# Patient Record
Sex: FEMALE | Race: WHITE | NOT HISPANIC OR LATINO | URBAN - METROPOLITAN AREA
[De-identification: names, ages, dates, MRNs, and addresses within clinical notes are randomized per-mention and may not be internally consistent; named-entity substitution may affect disease eponyms.]

---

## 2017-02-17 ENCOUNTER — ON CAMPUS - OUTPATIENT (OUTPATIENT)
Dept: URBAN - METROPOLITAN AREA HOSPITAL 77 | Facility: HOSPITAL | Age: 52
End: 2017-02-17
Payer: COMMERCIAL

## 2017-02-17 DIAGNOSIS — Z12.11 ENCOUNTER FOR SCREENING FOR MALIGNANT NEOPLASM OF COLON: ICD-10-CM

## 2017-02-17 PROCEDURE — 45378 DIAGNOSTIC COLONOSCOPY: CPT | Performed by: INTERNAL MEDICINE

## 2019-09-10 ENCOUNTER — TELEPHONE (OUTPATIENT)
Dept: FAMILY MEDICINE CLINIC | Facility: CLINIC | Age: 54
End: 2019-09-10

## 2019-09-10 PROBLEM — Z12.39 ENCOUNTER FOR SCREENING FOR MALIGNANT NEOPLASM OF BREAST: Status: ACTIVE | Noted: 2017-01-04

## 2019-09-10 PROBLEM — Z87.891 FORMER SMOKER: Status: ACTIVE | Noted: 2017-01-04

## 2019-09-10 PROBLEM — Z12.11 COLON CANCER SCREENING: Status: ACTIVE | Noted: 2017-01-04

## 2019-09-10 PROBLEM — J02.9 SORE THROAT: Status: ACTIVE | Noted: 2017-01-18

## 2019-09-10 PROBLEM — J32.9 SINUSITIS: Status: ACTIVE | Noted: 2017-01-18

## 2019-09-10 PROBLEM — Z11.59 ENCOUNTER FOR SCREENING FOR OTHER VIRAL DISEASES: Status: ACTIVE | Noted: 2018-08-02

## 2019-09-10 PROBLEM — R03.0 ELEVATED BLOOD PRESSURE READING WITHOUT DIAGNOSIS OF HYPERTENSION: Status: ACTIVE | Noted: 2017-01-04

## 2019-09-10 PROBLEM — R53.83 FATIGUE: Status: ACTIVE | Noted: 2018-08-02

## 2019-09-10 PROBLEM — Z12.31 VISIT FOR SCREENING MAMMOGRAM: Status: ACTIVE | Noted: 2018-05-23

## 2019-09-10 RX ORDER — VENLAFAXINE HYDROCHLORIDE 75 MG/1
75 CAPSULE, EXTENDED RELEASE ORAL DAILY
Qty: 90 CAPSULE | Refills: 0 | Status: SHIPPED | OUTPATIENT
Start: 2019-09-10 | End: 2019-11-13 | Stop reason: SDUPTHER

## 2019-09-10 RX ORDER — ALENDRONATE SODIUM 70 MG/1
1 TABLET ORAL
COMMUNITY
Start: 2018-10-02 | End: 2019-09-26

## 2019-09-10 RX ORDER — VENLAFAXINE HYDROCHLORIDE 75 MG/1
1 CAPSULE, EXTENDED RELEASE ORAL DAILY
COMMUNITY
Start: 2014-03-19 | End: 2019-09-10 | Stop reason: SDUPTHER

## 2019-09-10 NOTE — TELEPHONE ENCOUNTER
Last visit:  8/2/18  Next visit: 9/26/19  Last labs: none    Rx requested: Venlafaxine ER 75mg caps  Pharmacy: [facility]

## 2019-09-26 ENCOUNTER — OFFICE VISIT (OUTPATIENT)
Dept: FAMILY MEDICINE CLINIC | Facility: CLINIC | Age: 54
End: 2019-09-26

## 2019-09-26 VITALS
WEIGHT: 164 LBS | BODY MASS INDEX: 25.74 KG/M2 | SYSTOLIC BLOOD PRESSURE: 144 MMHG | HEIGHT: 67 IN | RESPIRATION RATE: 14 BRPM | HEART RATE: 57 BPM | OXYGEN SATURATION: 97 % | DIASTOLIC BLOOD PRESSURE: 91 MMHG | TEMPERATURE: 98.1 F

## 2019-09-26 DIAGNOSIS — M81.8 OTHER OSTEOPOROSIS WITHOUT CURRENT PATHOLOGICAL FRACTURE: ICD-10-CM

## 2019-09-26 DIAGNOSIS — Z00.00 ENCOUNTER FOR GENERAL ADULT MEDICAL EXAMINATION WITHOUT ABNORMAL FINDINGS: Primary | ICD-10-CM

## 2019-09-26 DIAGNOSIS — E55.9 VITAMIN D DEFICIENCY: ICD-10-CM

## 2019-09-26 DIAGNOSIS — E61.1 IRON DEFICIENCY: ICD-10-CM

## 2019-09-26 DIAGNOSIS — Z23 FLU VACCINE NEED: ICD-10-CM

## 2019-09-26 DIAGNOSIS — Z12.39 SCREENING FOR BREAST CANCER: ICD-10-CM

## 2019-09-26 DIAGNOSIS — Z79.899 MEDICATION MANAGEMENT: ICD-10-CM

## 2019-09-26 LAB
ALBUMIN SERPL-MCNC: 4.4 G/DL (ref 3.5–4.8)
ALBUMIN/GLOB SERPL: 1.5 G/DL (ref 1–1.7)
ALP SERPL-CCNC: 72 U/L (ref 32–91)
ALT SERPL W P-5'-P-CCNC: 47 U/L (ref 14–54)
ANION GAP SERPL CALCULATED.3IONS-SCNC: 13.1 MMOL/L (ref 5–15)
AST SERPL-CCNC: 44 U/L (ref 15–41)
BILIRUB SERPL-MCNC: 0.6 MG/DL (ref 0.3–1.2)
BUN BLD-MCNC: 12 MG/DL (ref 8–20)
BUN/CREAT SERPL: 15 (ref 5.4–26.2)
CALCIUM SPEC-SCNC: 9.7 MG/DL (ref 8.9–10.3)
CHLORIDE SERPL-SCNC: 105 MMOL/L (ref 101–111)
CO2 SERPL-SCNC: 31 MMOL/L (ref 22–32)
CREAT BLD-MCNC: 0.8 MG/DL (ref 0.4–1)
DEPRECATED RDW RBC AUTO: 51.2 FL (ref 37–54)
ERYTHROCYTE [DISTWIDTH] IN BLOOD BY AUTOMATED COUNT: 15.4 % (ref 12.3–15.4)
FERRITIN SERPL-MCNC: 11 NG/ML (ref 11–307)
GFR SERPL CREATININE-BSD FRML MDRD: 75 ML/MIN/1.73
GLOBULIN UR ELPH-MCNC: 2.9 GM/DL (ref 2.5–3.8)
GLUCOSE BLD-MCNC: 84 MG/DL (ref 65–99)
HCT VFR BLD AUTO: 40.5 % (ref 34–46.6)
HGB BLD-MCNC: 13.4 G/DL (ref 12–15.9)
IRON 24H UR-MRATE: 73 MCG/DL (ref 28–170)
IRON SATN MFR SERPL: 16 % (ref 15–50)
MCH RBC QN AUTO: 31.3 PG (ref 26.6–33)
MCHC RBC AUTO-ENTMCNC: 33.1 G/DL (ref 31.5–35.7)
MCV RBC AUTO: 94.6 FL (ref 79–97)
PLATELET # BLD AUTO: 264 10*3/MM3 (ref 140–450)
PMV BLD AUTO: 8.9 FL (ref 6–12)
POTASSIUM BLD-SCNC: 4.1 MMOL/L (ref 3.6–5.1)
PROT SERPL-MCNC: 7.3 G/DL (ref 6.1–7.9)
RBC # BLD AUTO: 4.28 10*6/MM3 (ref 3.77–5.28)
SODIUM BLD-SCNC: 145 MMOL/L (ref 136–144)
TIBC SERPL-MCNC: 454 MCG/DL (ref 228–428)
TRANSFERRIN SERPL-MCNC: 324 MG/DL (ref 200–360)
WBC NRBC COR # BLD: 5.6 10*3/MM3 (ref 3.4–10.8)

## 2019-09-26 PROCEDURE — 85027 COMPLETE CBC AUTOMATED: CPT | Performed by: FAMILY MEDICINE

## 2019-09-26 PROCEDURE — 83540 ASSAY OF IRON: CPT | Performed by: FAMILY MEDICINE

## 2019-09-26 PROCEDURE — 87624 HPV HI-RISK TYP POOLED RSLT: CPT | Performed by: FAMILY MEDICINE

## 2019-09-26 PROCEDURE — 99396 PREV VISIT EST AGE 40-64: CPT | Performed by: FAMILY MEDICINE

## 2019-09-26 PROCEDURE — G0148 SCR C/V CYTO, AUTOSYS, RESCR: HCPCS

## 2019-09-26 PROCEDURE — 84466 ASSAY OF TRANSFERRIN: CPT | Performed by: FAMILY MEDICINE

## 2019-09-26 PROCEDURE — 80053 COMPREHEN METABOLIC PANEL: CPT | Performed by: FAMILY MEDICINE

## 2019-09-26 PROCEDURE — 82306 VITAMIN D 25 HYDROXY: CPT | Performed by: FAMILY MEDICINE

## 2019-09-26 PROCEDURE — 90471 IMMUNIZATION ADMIN: CPT | Performed by: FAMILY MEDICINE

## 2019-09-26 PROCEDURE — 90674 CCIIV4 VAC NO PRSV 0.5 ML IM: CPT | Performed by: FAMILY MEDICINE

## 2019-09-26 PROCEDURE — 82728 ASSAY OF FERRITIN: CPT | Performed by: FAMILY MEDICINE

## 2019-09-26 RX ORDER — LORATADINE 10 MG/1
10 TABLET ORAL DAILY
COMMUNITY

## 2019-09-27 LAB — 25(OH)D3 SERPL-MCNC: 84.8 NG/ML (ref 30–100)

## 2019-10-02 LAB
AGE GDLN ACOG TESTING: NORMAL
CHROM ANALY OVERALL INTERP-IMP: NORMAL
CONV .: NORMAL
CONV PERFORMED BY:: NORMAL
DX ICD CODE: NORMAL
HIV 1 & 2 AB SER-IMP: NORMAL
HPV I/H RISK 1 DNA CVX QL PROBE+SIG AMP: NEGATIVE
Lab: NORMAL
RECOM F/U CVX/VAG CYTO: NORMAL
REF LAB TEST METHOD: NORMAL
STAT OF ADQ CVX/VAG CYTO-IMP: NORMAL

## 2019-10-23 DIAGNOSIS — Z12.39 SCREENING FOR BREAST CANCER: ICD-10-CM

## 2019-11-11 NOTE — PROGRESS NOTES
Subjective   Anali Julio is a 54 y.o. female.     Chief Complaint   Patient presents with   • Annual Exam     physical with papsmear       History of Present Illness     The following portions of the patient's history were reviewed and updated as appropriate: allergies, current medications, past family history, past medical history, past social history, past surgical history and problem list.    Past Medical History:   Diagnosis Date   • Anemia 1/30/2012   • Dry eye syndrome 1/30/2012   • Dysthymia 1/30/2012   • Elevated blood pressure reading without diagnosis of hypertension 1/4/2017   • Encounter for general adult medical examination without abnormal findings 10/28/2015   • Encounter for screening for malignant neoplasm of breast 1/4/2017   • Encounter for screening for other viral diseases 8/2/2018   • Former smoker 1/4/2017   • Generalized anxiety disorder 10/28/2015   • Increased frequency of urination 9/22/2015   • Kidney stone 2010   • Osteopenia 2010    Osteopenia   • Vitamin D deficiency 1/30/2012       Past Surgical History:   Procedure Laterality Date   • COLONOSCOPY  2017    Clear results   • TUBAL ABDOMINAL LIGATION  2001       Family History   Problem Relation Age of Onset   • Arthritis Mother    • Hyperlipidemia Mother    • Heart disease Father    • Hyperlipidemia Maternal Grandmother    • Vision loss Maternal Grandmother        Review of Systems   Constitutional: Negative for fatigue, unexpected weight gain and unexpected weight loss.   HENT: Negative for congestion, sinus pressure and sore throat.    Eyes: Negative for blurred vision and visual disturbance.   Respiratory: Negative for cough, shortness of breath and wheezing.    Cardiovascular: Negative for chest pain, palpitations and leg swelling.   Gastrointestinal: Negative for abdominal pain, constipation, diarrhea, nausea, vomiting, GERD and indigestion.   Musculoskeletal: Negative for arthralgias, back pain, gait problem, joint swelling and  neck pain.   Skin: Negative for rash, skin lesions and bruise.   Allergic/Immunologic: Negative for environmental allergies.   Neurological: Negative for dizziness, tremors, syncope, weakness, light-headedness, headache and memory problem.   Psychiatric/Behavioral: Negative for sleep disturbance, depressed mood and stress. The patient is not nervous/anxious.        Objective   Physical Exam   Constitutional: She is oriented to person, place, and time. She appears well-developed and well-nourished. No distress.   HENT:   Head: Normocephalic and atraumatic.   Right Ear: External ear normal.   Left Ear: External ear normal.   Nose: Nose normal.   Mouth/Throat: Oropharynx is clear and moist.   Eyes: EOM are normal. Pupils are equal, round, and reactive to light.   Neck: Normal range of motion. Neck supple. No thyromegaly present.   Cardiovascular: Normal rate, regular rhythm and normal heart sounds. Exam reveals no gallop and no friction rub.   No murmur heard.  Pulmonary/Chest: Effort normal. She has no wheezes.   Abdominal: Soft. There is no tenderness.   Genitourinary: Vagina normal and uterus normal. No vaginal discharge found.   Musculoskeletal: Normal range of motion. She exhibits no edema, tenderness or deformity.   Lymphadenopathy:     She has no cervical adenopathy.   Neurological: She is alert and oriented to person, place, and time. No cranial nerve deficit.   Skin: Skin is warm and dry. No rash noted. She is not diaphoretic.   Psychiatric: She has a normal mood and affect. Her behavior is normal. Judgment and thought content normal.   Nursing note and vitals reviewed.        Assessment/Plan   Anali was seen today for annual exam.    Diagnoses and all orders for this visit:    Encounter for general adult medical examination without abnormal findings  -     PapIG HPV Age Gdln ACOG; Future  -     PapIG HPV Age Gdln ACOG  -     PapIG, HPV, Rfx 16 / 18; Future  -     PapIG, HPV, Rfx 16 / 18    Iron deficiency  -      CBC (No Diff); Future  -     Iron Profile  -     Ferritin  -     CBC (No Diff)    Medication management  -     Comprehensive Metabolic Panel    Vitamin D deficiency  -     Vitamin D 25 Hydroxy    Other osteoporosis without current pathological fracture  -     DEXA Bone Density Axial    Screening for breast cancer  -     Mammo Screening Digital Tomosynthesis Bilateral With CAD; Future    Flu vaccine need  -     Flucelvax Quad=>4Years (PFS)    fu labs  Refill meds  Schedule mammogram and Dexa             Vitals:    09/26/19 1410   BP: 144/91   Pulse: 57   Resp: 14   Temp: 98.1 °F (36.7 °C)   SpO2: 97%     Body mass index is 25.69 kg/m².    No results found for: HGBA1C, POCGLU, LDL, CBC, CMP, TSH  Results for orders placed or performed in visit on 09/26/19   PapIG HPV Age Gdln ACOG   Result Value Ref Range    Age Gdln ACOG Testing 30-65    PapIG, HPV, Rfx 16 / 18   Result Value Ref Range    Diagnosis Comment     Recommendation: Comment     Specimen adequacy: Comment     Clinician Provided ICD-10: Comment     Performed by: Comment     QC reviewed by: Comment     . .     Note: Comment     Method: Comment     HPV, high-risk Negative Negative   Iron Profile   Result Value Ref Range    Iron 73 28 - 170 mcg/dL    Iron Saturation 16 15 - 50 %    Transferrin 324 200 - 360 mg/dL    TIBC 454 (H) 228 - 428 mcg/dL   Vitamin D 25 Hydroxy   Result Value Ref Range    25 Hydroxy, Vitamin D 84.8 30.0 - 100.0 ng/ml   CBC (No Diff)   Result Value Ref Range    WBC 5.60 3.40 - 10.80 10*3/mm3    RBC 4.28 3.77 - 5.28 10*6/mm3    Hemoglobin 13.4 12.0 - 15.9 g/dL    Hematocrit 40.5 34.0 - 46.6 %    MCV 94.6 79.0 - 97.0 fL    MCH 31.3 26.6 - 33.0 pg    MCHC 33.1 31.5 - 35.7 g/dL    RDW 15.4 12.3 - 15.4 %    RDW-SD 51.2 37.0 - 54.0 fl    MPV 8.9 6.0 - 12.0 fL    Platelets 264 140 - 450 10*3/mm3   Ferritin   Result Value Ref Range    Ferritin 11.00 11.00 - 307.00 ng/mL   Comprehensive Metabolic Panel   Result Value Ref Range    Glucose 84 65 -  99 mg/dL    BUN 12 8 - 20 mg/dL    Creatinine 0.80 0.40 - 1.00 mg/dL    Sodium 145 (H) 136 - 144 mmol/L    Potassium 4.1 3.6 - 5.1 mmol/L    Chloride 105 101 - 111 mmol/L    CO2 31.0 22.0 - 32.0 mmol/L    Calcium 9.7 8.9 - 10.3 mg/dL    Total Protein 7.3 6.1 - 7.9 g/dL    Albumin 4.40 3.50 - 4.80 g/dL    ALT (SGPT) 47 14 - 54 U/L    AST (SGOT) 44 (H) 15 - 41 U/L    Alkaline Phosphatase 72 32 - 91 U/L    Total Bilirubin 0.6 0.3 - 1.2 mg/dL    eGFR Non African Amer 75 >60 mL/min/1.73    Globulin 2.9 2.5 - 3.8 gm/dL    A/G Ratio 1.5 1.0 - 1.7 g/dL    BUN/Creatinine Ratio 15.0 5.4 - 26.2    Anion Gap 13.1 5.0 - 15.0 mmol/L

## 2019-11-15 RX ORDER — VENLAFAXINE HYDROCHLORIDE 75 MG/1
CAPSULE, EXTENDED RELEASE ORAL
Qty: 90 CAPSULE | Refills: 0 | Status: SHIPPED | OUTPATIENT
Start: 2019-11-15 | End: 2020-01-30 | Stop reason: SDUPTHER

## 2020-01-30 RX ORDER — VENLAFAXINE HYDROCHLORIDE 75 MG/1
75 CAPSULE, EXTENDED RELEASE ORAL DAILY
Qty: 90 CAPSULE | Refills: 0 | Status: SHIPPED | OUTPATIENT
Start: 2020-01-30 | End: 2020-02-04 | Stop reason: SDUPTHER

## 2020-01-30 NOTE — TELEPHONE ENCOUNTER
Last visit:  9/26/19  Next visit:none  Last labs: 9/26/19     Rx requested: venlafaxine XR   Pharmacy: [facility]

## 2020-02-04 RX ORDER — VENLAFAXINE HYDROCHLORIDE 75 MG/1
75 CAPSULE, EXTENDED RELEASE ORAL DAILY
Qty: 90 CAPSULE | Refills: 0 | Status: SHIPPED | OUTPATIENT
Start: 2020-02-04 | End: 2020-05-22

## 2020-03-11 ENCOUNTER — HOSPITAL ENCOUNTER (OUTPATIENT)
Dept: BONE DENSITY | Facility: HOSPITAL | Age: 55
Discharge: HOME | End: 2020-03-11
Admitting: FAMILY MEDICINE

## 2020-03-11 PROCEDURE — 77080 DXA BONE DENSITY AXIAL: CPT

## 2020-05-22 RX ORDER — VENLAFAXINE HYDROCHLORIDE 75 MG/1
CAPSULE, EXTENDED RELEASE ORAL
Qty: 90 CAPSULE | Refills: 0 | Status: SHIPPED | OUTPATIENT
Start: 2020-05-22 | End: 2020-07-21

## 2020-05-22 NOTE — TELEPHONE ENCOUNTER
( patient)    Last visit: 9/26/19   Next visit: none  Last labs: 9/26/19    Rx requested: Venlafaxine   Pharmacy:[facility]

## 2020-07-21 RX ORDER — VENLAFAXINE HYDROCHLORIDE 75 MG/1
CAPSULE, EXTENDED RELEASE ORAL
Qty: 90 CAPSULE | Refills: 0 | Status: SHIPPED | OUTPATIENT
Start: 2020-07-21 | End: 2020-09-21

## 2020-08-27 ENCOUNTER — OFFICE VISIT (OUTPATIENT)
Dept: FAMILY MEDICINE CLINIC | Facility: CLINIC | Age: 55
End: 2020-08-27

## 2020-08-27 VITALS
TEMPERATURE: 98 F | HEART RATE: 64 BPM | RESPIRATION RATE: 14 BRPM | HEIGHT: 67 IN | OXYGEN SATURATION: 96 % | SYSTOLIC BLOOD PRESSURE: 162 MMHG | DIASTOLIC BLOOD PRESSURE: 90 MMHG | WEIGHT: 167 LBS | BODY MASS INDEX: 26.21 KG/M2

## 2020-08-27 DIAGNOSIS — Z11.59 NEED FOR HEPATITIS C SCREENING TEST: ICD-10-CM

## 2020-08-27 DIAGNOSIS — R30.0 DYSURIA: ICD-10-CM

## 2020-08-27 DIAGNOSIS — R03.0 ELEVATED BLOOD PRESSURE READING: ICD-10-CM

## 2020-08-27 DIAGNOSIS — E04.9 GOITER: ICD-10-CM

## 2020-08-27 DIAGNOSIS — E78.2 MIXED DYSLIPIDEMIA: ICD-10-CM

## 2020-08-27 DIAGNOSIS — R10.31 RIGHT LOWER QUADRANT ABDOMINAL PAIN: Primary | ICD-10-CM

## 2020-08-27 PROCEDURE — 80053 COMPREHEN METABOLIC PANEL: CPT | Performed by: FAMILY MEDICINE

## 2020-08-27 PROCEDURE — 36415 COLL VENOUS BLD VENIPUNCTURE: CPT | Performed by: FAMILY MEDICINE

## 2020-08-27 PROCEDURE — 83690 ASSAY OF LIPASE: CPT | Performed by: FAMILY MEDICINE

## 2020-08-27 PROCEDURE — 80061 LIPID PANEL: CPT | Performed by: FAMILY MEDICINE

## 2020-08-27 PROCEDURE — 84443 ASSAY THYROID STIM HORMONE: CPT | Performed by: FAMILY MEDICINE

## 2020-08-27 PROCEDURE — 82150 ASSAY OF AMYLASE: CPT | Performed by: FAMILY MEDICINE

## 2020-08-27 PROCEDURE — 86803 HEPATITIS C AB TEST: CPT | Performed by: FAMILY MEDICINE

## 2020-08-27 PROCEDURE — 85025 COMPLETE CBC W/AUTO DIFF WBC: CPT | Performed by: FAMILY MEDICINE

## 2020-08-27 PROCEDURE — 99214 OFFICE O/P EST MOD 30 MIN: CPT | Performed by: FAMILY MEDICINE

## 2020-08-27 PROCEDURE — 84439 ASSAY OF FREE THYROXINE: CPT | Performed by: FAMILY MEDICINE

## 2020-08-27 PROCEDURE — 81003 URINALYSIS AUTO W/O SCOPE: CPT | Performed by: FAMILY MEDICINE

## 2020-08-27 NOTE — PROGRESS NOTES
Subjective   Anali Julio is a 55 y.o. female.     Chief Complaint   Patient presents with   • Abdominal Pain     abdominal pain started a year ago and it is getting worse.        History of Present Illness   Pt has had abdominal pain for past year more freqeunt in past month  She did miss work earlier this week with pain, nausea, vomiting    BP elevated today  Family hx CAd    The following portions of the patient's history were reviewed and updated as appropriate: allergies, current medications, past family history, past medical history, past social history, past surgical history and problem list.    Past Medical History:   Diagnosis Date   • Anemia 1/30/2012   • Dry eye syndrome 1/30/2012   • Dysthymia 1/30/2012   • Elevated blood pressure reading without diagnosis of hypertension 1/4/2017   • Encounter for general adult medical examination without abnormal findings 10/28/2015   • Encounter for screening for malignant neoplasm of breast 1/4/2017   • Encounter for screening for other viral diseases 8/2/2018   • Former smoker 1/4/2017   • Generalized anxiety disorder 10/28/2015   • Increased frequency of urination 9/22/2015   • Kidney stone 2010   • Osteopenia 2010    Osteopenia   • Vitamin D deficiency 1/30/2012       Past Surgical History:   Procedure Laterality Date   • COLONOSCOPY  2017    Clear results   • TUBAL ABDOMINAL LIGATION  2001       Family History   Problem Relation Age of Onset   • Arthritis Mother    • Hyperlipidemia Mother    • Heart disease Father    • Hyperlipidemia Maternal Grandmother    • Vision loss Maternal Grandmother        Review of Systems   Constitutional: Negative for fatigue, fever, unexpected weight gain and unexpected weight loss.   HENT: Negative for congestion, sinus pressure and sore throat.    Eyes: Negative for blurred vision and visual disturbance.   Respiratory: Negative for cough, shortness of breath and wheezing.    Cardiovascular: Negative for chest pain, palpitations  and leg swelling.   Gastrointestinal: Positive for abdominal pain, constipation, nausea and vomiting. Negative for diarrhea, GERD and indigestion.   Genitourinary: Positive for frequency. Negative for dysuria and hematuria.   Musculoskeletal: Negative for arthralgias, back pain, gait problem, joint swelling, myalgias and neck pain.   Skin: Negative for rash, skin lesions and bruise.   Allergic/Immunologic: Negative for environmental allergies.   Neurological: Negative for dizziness, tremors, syncope, weakness, light-headedness, headache and memory problem.   Psychiatric/Behavioral: Negative for sleep disturbance, depressed mood and stress. The patient is not nervous/anxious.        Objective   Physical Exam   Constitutional: She is oriented to person, place, and time. She appears well-developed and well-nourished. No distress.   HENT:   Head: Normocephalic and atraumatic.   Right Ear: External ear normal.   Left Ear: External ear normal.   Nose: Nose normal.   Mouth/Throat: Oropharynx is clear and moist.   Eyes: Pupils are equal, round, and reactive to light. EOM are normal.   Neck: Normal range of motion. Neck supple. No thyromegaly present.   Cardiovascular: Normal rate, regular rhythm and normal heart sounds. Exam reveals no gallop and no friction rub.   No murmur heard.  Pulmonary/Chest: Effort normal. She has no wheezes.   Abdominal: Soft. There is no tenderness.   Musculoskeletal: Normal range of motion. She exhibits no edema, tenderness or deformity.   Lymphadenopathy:     She has no cervical adenopathy.   Neurological: She is alert and oriented to person, place, and time. No cranial nerve deficit.   Skin: Skin is warm and dry. No rash noted. She is not diaphoretic.   Psychiatric: She has a normal mood and affect. Her behavior is normal. Judgment and thought content normal.   Nursing note and vitals reviewed.    Vitals:    08/27/20 1007   BP: 148/90   Pulse: 59   Resp: 14   Temp: 98 °F (36.7 °C)   SpO2: 96%      Body mass index is 26.16 kg/m².    No results found for: HGBA1C, POCGLU, LDL, CBC, CMP, TSH  Results for orders placed or performed in visit on 09/26/19   PapIG HPV Age Gdln ACOG   Result Value Ref Range    Age Gdln ACOG Testing 30-65    PapIG, HPV, Rfx 16 / 18   Result Value Ref Range    Diagnosis Comment     Recommendation: Comment     Specimen adequacy: Comment     Clinician Provided ICD-10: Comment     Performed by: Comment     QC reviewed by: Comment     . .     Note: Comment     Method: Comment     HPV, high-risk Negative Negative   Iron Profile   Result Value Ref Range    Iron 73 28 - 170 mcg/dL    Iron Saturation 16 15 - 50 %    Transferrin 324 200 - 360 mg/dL    TIBC 454 (H) 228 - 428 mcg/dL   Vitamin D 25 Hydroxy   Result Value Ref Range    25 Hydroxy, Vitamin D 84.8 30.0 - 100.0 ng/ml   CBC (No Diff)   Result Value Ref Range    WBC 5.60 3.40 - 10.80 10*3/mm3    RBC 4.28 3.77 - 5.28 10*6/mm3    Hemoglobin 13.4 12.0 - 15.9 g/dL    Hematocrit 40.5 34.0 - 46.6 %    MCV 94.6 79.0 - 97.0 fL    MCH 31.3 26.6 - 33.0 pg    MCHC 33.1 31.5 - 35.7 g/dL    RDW 15.4 12.3 - 15.4 %    RDW-SD 51.2 37.0 - 54.0 fl    MPV 8.9 6.0 - 12.0 fL    Platelets 264 140 - 450 10*3/mm3   Ferritin   Result Value Ref Range    Ferritin 11.00 11.00 - 307.00 ng/mL   Comprehensive Metabolic Panel   Result Value Ref Range    Glucose 84 65 - 99 mg/dL    BUN 12 8 - 20 mg/dL    Creatinine 0.80 0.40 - 1.00 mg/dL    Sodium 145 (H) 136 - 144 mmol/L    Potassium 4.1 3.6 - 5.1 mmol/L    Chloride 105 101 - 111 mmol/L    CO2 31.0 22.0 - 32.0 mmol/L    Calcium 9.7 8.9 - 10.3 mg/dL    Total Protein 7.3 6.1 - 7.9 g/dL    Albumin 4.40 3.50 - 4.80 g/dL    ALT (SGPT) 47 14 - 54 U/L    AST (SGOT) 44 (H) 15 - 41 U/L    Alkaline Phosphatase 72 32 - 91 U/L    Total Bilirubin 0.6 0.3 - 1.2 mg/dL    eGFR Non African Amer 75 >60 mL/min/1.73    Globulin 2.9 2.5 - 3.8 gm/dL    A/G Ratio 1.5 1.0 - 1.7 g/dL    BUN/Creatinine Ratio 15.0 5.4 - 26.2    Anion Gap 13.1  5.0 - 15.0 mmol/L       Assessment/Plan   Anali was seen today for abdominal pain.    Diagnoses and all orders for this visit:    Right lower quadrant abdominal pain  -     CBC & Differential  -     Comprehensive Metabolic Panel  -     CT Abdomen Pelvis Without Contrast; Future  -     Amylase; Future  -     Lipase; Future    Elevated blood pressure reading    Dysuria  -     Urinalysis With / Culture If Indicated -; Future    Goiter  -     TSH+Free T4    Mixed dyslipidemia  -     Lipid Panel; Future    fu labs  Get CT  Monitor blood pressure  Get shingrix

## 2020-08-28 LAB
ALBUMIN SERPL-MCNC: 4.8 G/DL (ref 3.5–5.2)
ALBUMIN/GLOB SERPL: 1.5 G/DL
ALP SERPL-CCNC: 68 U/L (ref 39–117)
ALT SERPL W P-5'-P-CCNC: 21 U/L (ref 1–33)
AMYLASE SERPL-CCNC: 60 U/L (ref 28–100)
ANION GAP SERPL CALCULATED.3IONS-SCNC: 11.9 MMOL/L (ref 5–15)
AST SERPL-CCNC: 24 U/L (ref 1–32)
BASOPHILS # BLD AUTO: 0.05 10*3/MM3 (ref 0–0.2)
BASOPHILS NFR BLD AUTO: 0.9 % (ref 0–1.5)
BILIRUB SERPL-MCNC: 0.4 MG/DL (ref 0–1.2)
BILIRUB UR QL STRIP: NEGATIVE
BUN SERPL-MCNC: 14 MG/DL (ref 6–20)
BUN/CREAT SERPL: 23.7 (ref 7–25)
CALCIUM SPEC-SCNC: 10.3 MG/DL (ref 8.6–10.5)
CHLORIDE SERPL-SCNC: 104 MMOL/L (ref 98–107)
CHOLEST SERPL-MCNC: 234 MG/DL (ref 0–200)
CLARITY UR: CLEAR
CO2 SERPL-SCNC: 29.1 MMOL/L (ref 22–29)
COLOR UR: YELLOW
CREAT SERPL-MCNC: 0.59 MG/DL (ref 0.57–1)
DEPRECATED RDW RBC AUTO: 47.6 FL (ref 37–54)
EOSINOPHIL # BLD AUTO: 0.2 10*3/MM3 (ref 0–0.4)
EOSINOPHIL NFR BLD AUTO: 3.6 % (ref 0.3–6.2)
ERYTHROCYTE [DISTWIDTH] IN BLOOD BY AUTOMATED COUNT: 13.3 % (ref 12.3–15.4)
GFR SERPL CREATININE-BSD FRML MDRD: 106 ML/MIN/1.73
GLOBULIN UR ELPH-MCNC: 3.2 GM/DL
GLUCOSE SERPL-MCNC: 82 MG/DL (ref 65–99)
GLUCOSE UR STRIP-MCNC: NEGATIVE MG/DL
HCT VFR BLD AUTO: 44.3 % (ref 34–46.6)
HCV AB SER DONR QL: NORMAL
HDLC SERPL-MCNC: 100 MG/DL (ref 40–60)
HGB BLD-MCNC: 14.2 G/DL (ref 12–15.9)
HGB UR QL STRIP.AUTO: NEGATIVE
IMM GRANULOCYTES # BLD AUTO: 0.01 10*3/MM3 (ref 0–0.05)
IMM GRANULOCYTES NFR BLD AUTO: 0.2 % (ref 0–0.5)
KETONES UR QL STRIP: NEGATIVE
LDLC SERPL CALC-MCNC: 124 MG/DL (ref 0–100)
LDLC/HDLC SERPL: 1.24 {RATIO}
LEUKOCYTE ESTERASE UR QL STRIP.AUTO: NEGATIVE
LIPASE SERPL-CCNC: 35 U/L (ref 13–60)
LYMPHOCYTES # BLD AUTO: 1.73 10*3/MM3 (ref 0.7–3.1)
LYMPHOCYTES NFR BLD AUTO: 30.9 % (ref 19.6–45.3)
MCH RBC QN AUTO: 30.7 PG (ref 26.6–33)
MCHC RBC AUTO-ENTMCNC: 32.1 G/DL (ref 31.5–35.7)
MCV RBC AUTO: 95.9 FL (ref 79–97)
MONOCYTES # BLD AUTO: 0.66 10*3/MM3 (ref 0.1–0.9)
MONOCYTES NFR BLD AUTO: 11.8 % (ref 5–12)
NEUTROPHILS NFR BLD AUTO: 2.95 10*3/MM3 (ref 1.7–7)
NEUTROPHILS NFR BLD AUTO: 52.6 % (ref 42.7–76)
NITRITE UR QL STRIP: NEGATIVE
NRBC BLD AUTO-RTO: 0 /100 WBC (ref 0–0.2)
PH UR STRIP.AUTO: 7 [PH] (ref 5–8)
PLATELET # BLD AUTO: 284 10*3/MM3 (ref 140–450)
PMV BLD AUTO: 11.1 FL (ref 6–12)
POTASSIUM SERPL-SCNC: 4.4 MMOL/L (ref 3.5–5.2)
PROT SERPL-MCNC: 8 G/DL (ref 6–8.5)
PROT UR QL STRIP: NEGATIVE
RBC # BLD AUTO: 4.62 10*6/MM3 (ref 3.77–5.28)
SODIUM SERPL-SCNC: 145 MMOL/L (ref 136–145)
SP GR UR STRIP: 1.01 (ref 1–1.03)
T4 FREE SERPL-MCNC: 1.25 NG/DL (ref 0.93–1.7)
TRIGL SERPL-MCNC: 52 MG/DL (ref 0–150)
TSH SERPL DL<=0.05 MIU/L-ACNC: 0.87 UIU/ML (ref 0.27–4.2)
UROBILINOGEN UR QL STRIP: NORMAL
VLDLC SERPL-MCNC: 10.4 MG/DL (ref 5–40)
WBC # BLD AUTO: 5.6 10*3/MM3 (ref 3.4–10.8)

## 2020-09-04 ENCOUNTER — HOSPITAL ENCOUNTER (OUTPATIENT)
Dept: CT IMAGING | Facility: HOSPITAL | Age: 55
Discharge: HOME | End: 2020-09-04
Admitting: FAMILY MEDICINE

## 2020-09-04 DIAGNOSIS — R10.31 RIGHT LOWER QUADRANT ABDOMINAL PAIN: ICD-10-CM

## 2020-09-04 PROCEDURE — 74177 CT ABD & PELVIS W/CONTRAST: CPT

## 2020-09-04 PROCEDURE — 0 IOPAMIDOL PER 1 ML: Performed by: FAMILY MEDICINE

## 2020-09-04 RX ADMIN — IOPAMIDOL 100 ML: 755 INJECTION, SOLUTION INTRAVENOUS at 17:30

## 2020-09-10 RX ORDER — DOCUSATE SODIUM 100 MG/1
100 CAPSULE, LIQUID FILLED ORAL 2 TIMES DAILY PRN
Qty: 180 CAPSULE | Refills: 2 | Status: SHIPPED | OUTPATIENT
Start: 2020-09-10 | End: 2021-04-30

## 2020-09-21 RX ORDER — VENLAFAXINE HYDROCHLORIDE 75 MG/1
CAPSULE, EXTENDED RELEASE ORAL
Qty: 90 CAPSULE | Refills: 1 | Status: SHIPPED | OUTPATIENT
Start: 2020-09-21 | End: 2021-03-03

## 2020-09-22 DIAGNOSIS — N20.0 KIDNEY STONES: Primary | ICD-10-CM

## 2020-10-08 ENCOUNTER — OFFICE VISIT (OUTPATIENT)
Dept: FAMILY MEDICINE CLINIC | Facility: CLINIC | Age: 55
End: 2020-10-08

## 2020-10-08 VITALS
HEART RATE: 64 BPM | TEMPERATURE: 97.3 F | OXYGEN SATURATION: 99 % | DIASTOLIC BLOOD PRESSURE: 83 MMHG | SYSTOLIC BLOOD PRESSURE: 142 MMHG | HEIGHT: 67 IN | BODY MASS INDEX: 26.9 KG/M2 | WEIGHT: 171.4 LBS | RESPIRATION RATE: 16 BRPM

## 2020-10-08 DIAGNOSIS — Z12.31 ENCOUNTER FOR SCREENING MAMMOGRAM FOR MALIGNANT NEOPLASM OF BREAST: ICD-10-CM

## 2020-10-08 DIAGNOSIS — Z23 IMMUNIZATION DUE: Primary | ICD-10-CM

## 2020-10-08 DIAGNOSIS — I10 HTN, GOAL BELOW 130/80: ICD-10-CM

## 2020-10-08 DIAGNOSIS — E78.2 MIXED DYSLIPIDEMIA: ICD-10-CM

## 2020-10-08 LAB
CHOLEST SERPL-MCNC: 222 MG/DL (ref 0–200)
EXPIRATION DATE: ABNORMAL
HDLC SERPL-MCNC: 100 MG/DL (ref 40–60)
LDLC SERPL CALC-MCNC: 0 MG/DL (ref 0–100)
Lab: ABNORMAL
TRIGL SERPL-MCNC: 52 MG/DL (ref 0–150)

## 2020-10-08 PROCEDURE — 90471 IMMUNIZATION ADMIN: CPT | Performed by: FAMILY MEDICINE

## 2020-10-08 PROCEDURE — 90750 HZV VACC RECOMBINANT IM: CPT | Performed by: FAMILY MEDICINE

## 2020-10-08 PROCEDURE — 80061 LIPID PANEL: CPT | Performed by: FAMILY MEDICINE

## 2020-10-08 PROCEDURE — 99214 OFFICE O/P EST MOD 30 MIN: CPT | Performed by: FAMILY MEDICINE

## 2020-10-08 RX ORDER — ALENDRONATE SODIUM 70 MG/1
70 TABLET ORAL
Qty: 90 TABLET | Refills: 1 | Status: SHIPPED | OUTPATIENT
Start: 2020-10-08 | End: 2021-11-02

## 2020-10-08 NOTE — PROGRESS NOTES
Subjective   Anali Julio is a 55 y.o. female.     Chief Complaint   Patient presents with   • Hypertension       History of Present Illness   Fu HTN, improved from previous, recommend low sodium diet, weight loss  Fu osteporosis, doing well with alendrnae  Fu HLD , osteoporosis  Stable on meds  Fu vit d def, taking daily vit d  She is  Planning to return to [employer] to work out  Fu anxiety doing well with effexor   Hx kidney stones follwed bu urology    Will get shingrix    The following portions of the patient's history were reviewed and updated as appropriate: allergies, current medications, past family history, past medical history, past social history, past surgical history and problem list.    Past Medical History:   Diagnosis Date   • Anemia 1/30/2012   • Dry eye syndrome 1/30/2012   • Dysthymia 1/30/2012   • Elevated blood pressure reading without diagnosis of hypertension 1/4/2017   • Encounter for general adult medical examination without abnormal findings 10/28/2015   • Encounter for screening for malignant neoplasm of breast 1/4/2017   • Encounter for screening for other viral diseases 8/2/2018   • Former smoker 1/4/2017   • Generalized anxiety disorder 10/28/2015   • Increased frequency of urination 9/22/2015   • Kidney stone 2010   • Osteopenia 2010    Osteopenia   • Vitamin D deficiency 1/30/2012       Past Surgical History:   Procedure Laterality Date   • COLONOSCOPY  2017    Clear results   • TUBAL ABDOMINAL LIGATION  2001       Family History   Problem Relation Age of Onset   • Arthritis Mother    • Hyperlipidemia Mother    • Heart disease Father    • Hyperlipidemia Maternal Grandmother    • Vision loss Maternal Grandmother        Review of Systems   Constitutional: Negative for fatigue, unexpected weight gain and unexpected weight loss.   HENT: Negative for congestion, sinus pressure and sore throat.    Eyes: Negative for blurred vision and visual disturbance.   Respiratory: Negative for  cough, shortness of breath and wheezing.    Cardiovascular: Negative for chest pain, palpitations and leg swelling.   Gastrointestinal: Negative for abdominal pain, constipation, diarrhea, nausea, vomiting, GERD and indigestion.   Musculoskeletal: Negative for arthralgias, back pain, gait problem, joint swelling and neck pain.   Skin: Negative for rash, skin lesions and bruise.   Allergic/Immunologic: Negative for environmental allergies.   Neurological: Negative for dizziness, tremors, syncope, weakness, light-headedness, headache and memory problem.   Psychiatric/Behavioral: Negative for sleep disturbance, depressed mood and stress. The patient is not nervous/anxious.      Objective   Physical Exam  Vitals signs and nursing note reviewed.   Constitutional:       General: She is not in acute distress.     Appearance: She is well-developed. She is not diaphoretic.   HENT:      Head: Normocephalic and atraumatic.      Right Ear: External ear normal.      Left Ear: External ear normal.      Nose: Nose normal.   Eyes:      Pupils: Pupils are equal, round, and reactive to light.   Neck:      Musculoskeletal: Normal range of motion and neck supple.      Thyroid: No thyromegaly.   Cardiovascular:      Rate and Rhythm: Normal rate and regular rhythm.      Heart sounds: Normal heart sounds. No murmur. No friction rub. No gallop.    Pulmonary:      Effort: Pulmonary effort is normal.      Breath sounds: No wheezing.   Abdominal:      Palpations: Abdomen is soft.      Tenderness: There is no abdominal tenderness.   Musculoskeletal: Normal range of motion.         General: No tenderness or deformity.   Lymphadenopathy:      Cervical: No cervical adenopathy.   Skin:     General: Skin is warm and dry.      Findings: No rash.   Neurological:      Mental Status: She is alert and oriented to person, place, and time.      Cranial Nerves: No cranial nerve deficit.   Psychiatric:         Behavior: Behavior normal.         Thought  Content: Thought content normal.         Judgment: Judgment normal.         Vitals:    10/08/20 0807   BP: 142/83   Pulse: 64   Resp: 16   Temp: 97.3 °F (36.3 °C)   SpO2: 99%     Body mass index is 26.85 kg/m².    LDL Cholesterol    Date Value Ref Range Status   10/08/2020 0 0 - 100 mg/dL Final   08/27/2020 124 (H) 0 - 100 mg/dL Final     TSH   Date Value Ref Range Status   08/27/2020 0.869 0.270 - 4.200 uIU/mL Final     Results for orders placed or performed in visit on 10/08/20   POC Lipid Panel    Specimen: Blood   Result Value Ref Range    Total Cholesterol 222 (A) 0 - 200 mg/dL    Triglycerides 52 0 - 150 mg/dL    HDL Cholesterol 100 (A) 40 - 60 mg/dL    LDL Cholesterol  0 0 - 100 mg/dL    Lot Number [id number]     Expiration Date 10,312,020    Results for orders placed or performed in visit on 08/27/20   Urinalysis With / Culture If Indicated - Urine, Clean Catch    Specimen: Urine, Clean Catch   Result Value Ref Range    Color, UA Yellow Yellow, Straw    Appearance, UA Clear Clear    pH, UA 7.0 5.0 - 8.0    Specific Gravity, UA 1.010 1.005 - 1.030    Glucose, UA Negative Negative    Ketones, UA Negative Negative    Bilirubin, UA Negative Negative    Blood, UA Negative Negative    Protein, UA Negative Negative    Leuk Esterase, UA Negative Negative    Nitrite, UA Negative Negative    Urobilinogen, UA 0.2 E.U./dL 0.2 - 1.0 E.U./dL   TSH+Free T4    Specimen: Blood   Result Value Ref Range    TSH 0.869 0.270 - 4.200 uIU/mL    Free T4 1.25 0.93 - 1.70 ng/dL   CBC Auto Differential    Specimen: Blood   Result Value Ref Range    WBC 5.60 3.40 - 10.80 10*3/mm3    RBC 4.62 3.77 - 5.28 10*6/mm3    Hemoglobin 14.2 12.0 - 15.9 g/dL    Hematocrit 44.3 34.0 - 46.6 %    MCV 95.9 79.0 - 97.0 fL    MCH 30.7 26.6 - 33.0 pg    MCHC 32.1 31.5 - 35.7 g/dL    RDW 13.3 12.3 - 15.4 %    RDW-SD 47.6 37.0 - 54.0 fl    MPV 11.1 6.0 - 12.0 fL    Platelets 284 140 - 450 10*3/mm3    Neutrophil % 52.6 42.7 - 76.0 %    Lymphocyte % 30.9 19.6  - 45.3 %    Monocyte % 11.8 5.0 - 12.0 %    Eosinophil % 3.6 0.3 - 6.2 %    Basophil % 0.9 0.0 - 1.5 %    Immature Grans % 0.2 0.0 - 0.5 %    Neutrophils, Absolute 2.95 1.70 - 7.00 10*3/mm3    Lymphocytes, Absolute 1.73 0.70 - 3.10 10*3/mm3    Monocytes, Absolute 0.66 0.10 - 0.90 10*3/mm3    Eosinophils, Absolute 0.20 0.00 - 0.40 10*3/mm3    Basophils, Absolute 0.05 0.00 - 0.20 10*3/mm3    Immature Grans, Absolute 0.01 0.00 - 0.05 10*3/mm3    nRBC 0.0 0.0 - 0.2 /100 WBC   Hepatitis C antibody    Specimen: Blood   Result Value Ref Range    Hepatitis C Ab Non-Reactive Non-Reactive   Lipase    Specimen: Blood   Result Value Ref Range    Lipase 35 13 - 60 U/L   Amylase    Specimen: Blood   Result Value Ref Range    Amylase 60 28 - 100 U/L   Lipid Panel    Specimen: Blood   Result Value Ref Range    Total Cholesterol 234 (H) 0 - 200 mg/dL    Triglycerides 52 0 - 150 mg/dL    HDL Cholesterol 100 (H) 40 - 60 mg/dL    LDL Cholesterol  124 (H) 0 - 100 mg/dL    VLDL Cholesterol 10.4 5 - 40 mg/dL    LDL/HDL Ratio 1.24    Comprehensive Metabolic Panel    Specimen: Blood   Result Value Ref Range    Glucose 82 65 - 99 mg/dL    BUN 14 6 - 20 mg/dL    Creatinine 0.59 0.57 - 1.00 mg/dL    Sodium 145 136 - 145 mmol/L    Potassium 4.4 3.5 - 5.2 mmol/L    Chloride 104 98 - 107 mmol/L    CO2 29.1 (H) 22.0 - 29.0 mmol/L    Calcium 10.3 8.6 - 10.5 mg/dL    Total Protein 8.0 6.0 - 8.5 g/dL    Albumin 4.80 3.50 - 5.20 g/dL    ALT (SGPT) 21 1 - 33 U/L    AST (SGOT) 24 1 - 32 U/L    Alkaline Phosphatase 68 39 - 117 U/L    Total Bilirubin 0.4 0.0 - 1.2 mg/dL    eGFR Non African Amer 106 >60 mL/min/1.73    Globulin 3.2 gm/dL    A/G Ratio 1.5 g/dL    BUN/Creatinine Ratio 23.7 7.0 - 25.0    Anion Gap 11.9 5.0 - 15.0 mmol/L   Results for orders placed or performed in visit on 09/26/19   PapIG HPV Age Gdln ACOG    Specimen: Cervix; ThinPrep Vial   Result Value Ref Range    Age Gdln ACOG Testing 30-65    PapIG, HPV, Rfx 16 / 18    Specimen: Cervix;  ThinPrep Vial   Result Value Ref Range    Diagnosis Comment     Recommendation: Comment     Specimen adequacy: Comment     Clinician Provided ICD-10: Comment     Performed by: Comment     QC reviewed by: Comment     . .     Note: Comment     Method: Comment     HPV, high-risk Negative Negative   Iron Profile    Specimen: Blood   Result Value Ref Range    Iron 73 28 - 170 mcg/dL    Iron Saturation 16 15 - 50 %    Transferrin 324 200 - 360 mg/dL    TIBC 454 (H) 228 - 428 mcg/dL   Vitamin D 25 Hydroxy    Specimen: Blood   Result Value Ref Range    25 Hydroxy, Vitamin D 84.8 30.0 - 100.0 ng/ml   CBC (No Diff)    Specimen: Blood   Result Value Ref Range    WBC 5.60 3.40 - 10.80 10*3/mm3    RBC 4.28 3.77 - 5.28 10*6/mm3    Hemoglobin 13.4 12.0 - 15.9 g/dL    Hematocrit 40.5 34.0 - 46.6 %    MCV 94.6 79.0 - 97.0 fL    MCH 31.3 26.6 - 33.0 pg    MCHC 33.1 31.5 - 35.7 g/dL    RDW 15.4 12.3 - 15.4 %    RDW-SD 51.2 37.0 - 54.0 fl    MPV 8.9 6.0 - 12.0 fL    Platelets 264 140 - 450 10*3/mm3   Ferritin    Specimen: Blood   Result Value Ref Range    Ferritin 11.00 11.00 - 307.00 ng/mL     *Note: Due to a large number of results and/or encounters for the requested time period, some results have not been displayed. A complete set of results can be found in Results Review.       Assessment/Plan   Anali was seen today for hypertension.    Diagnoses and all orders for this visit:    Immunization due  -     Shingrix Vaccine    Mixed dyslipidemia  -     POC Lipid Panel    HTN, goal below 130/80    Encounter for screening mammogram for malignant neoplasm of breast  -     Mammo Screening Digital Tomosynthesis Bilateral With CAD; Future    Other orders  -     alendronate (Fosamax) 70 MG tablet; Take 1 tablet by mouth Every 7 (Seven) Days.      Monitor blood pressure  Consider diuretics for bp control and kidney stone prevention  Refill alendronare

## 2020-10-08 NOTE — PATIENT INSTRUCTIONS
"Hypertension, Adult  High blood pressure (hypertension) is when the force of blood pumping through the arteries is too strong. The arteries are the blood vessels that carry blood from the heart throughout the body. Hypertension forces the heart to work harder to pump blood and may cause arteries to become narrow or stiff. Untreated or uncontrolled hypertension can cause a heart attack, heart failure, a stroke, kidney disease, and other problems.  A blood pressure reading consists of a higher number over a lower number. Ideally, your blood pressure should be below 120/80. The first (\"top\") number is called the systolic pressure. It is a measure of the pressure in your arteries as your heart beats. The second (\"bottom\") number is called the diastolic pressure. It is a measure of the pressure in your arteries as the heart relaxes.  What are the causes?  The exact cause of this condition is not known. There are some conditions that result in or are related to high blood pressure.  What increases the risk?  Some risk factors for high blood pressure are under your control. The following factors may make you more likely to develop this condition:  · Smoking.  · Having type 2 diabetes mellitus, high cholesterol, or both.  · Not getting enough exercise or physical activity.  · Being overweight.  · Having too much fat, sugar, calories, or salt (sodium) in your diet.  · Drinking too much alcohol.  Some risk factors for high blood pressure may be difficult or impossible to change. Some of these factors include:  · Having chronic kidney disease.  · Having a family history of high blood pressure.  · Age. Risk increases with age.  · Race. You may be at higher risk if you are .  · Gender. Men are at higher risk than women before age 45. After age 65, women are at higher risk than men.  · Having obstructive sleep apnea.  · Stress.  What are the signs or symptoms?  High blood pressure may not cause symptoms. Very high " blood pressure (hypertensive crisis) may cause:  · Headache.  · Anxiety.  · Shortness of breath.  · Nosebleed.  · Nausea and vomiting.  · Vision changes.  · Severe chest pain.  · Seizures.  How is this diagnosed?  This condition is diagnosed by measuring your blood pressure while you are seated, with your arm resting on a flat surface, your legs uncrossed, and your feet flat on the floor. The cuff of the blood pressure monitor will be placed directly against the skin of your upper arm at the level of your heart. It should be measured at least twice using the same arm. Certain conditions can cause a difference in blood pressure between your right and left arms.  Certain factors can cause blood pressure readings to be lower or higher than normal for a short period of time:  · When your blood pressure is higher when you are in a health care provider's office than when you are at home, this is called white coat hypertension. Most people with this condition do not need medicines.  · When your blood pressure is higher at home than when you are in a health care provider's office, this is called masked hypertension. Most people with this condition may need medicines to control blood pressure.  If you have a high blood pressure reading during one visit or you have normal blood pressure with other risk factors, you may be asked to:  · Return on a different day to have your blood pressure checked again.  · Monitor your blood pressure at home for 1 week or longer.  If you are diagnosed with hypertension, you may have other blood or imaging tests to help your health care provider understand your overall risk for other conditions.  How is this treated?  This condition is treated by making healthy lifestyle changes, such as eating healthy foods, exercising more, and reducing your alcohol intake. Your health care provider may prescribe medicine if lifestyle changes are not enough to get your blood pressure under control, and  if:  · Your systolic blood pressure is above 130.  · Your diastolic blood pressure is above 80.  Your personal target blood pressure may vary depending on your medical conditions, your age, and other factors.  Follow these instructions at home:  Eating and drinking    · Eat a diet that is high in fiber and potassium, and low in sodium, added sugar, and fat. An example eating plan is called the DASH (Dietary Approaches to Stop Hypertension) diet. To eat this way:  ? Eat plenty of fresh fruits and vegetables. Try to fill one half of your plate at each meal with fruits and vegetables.  ? Eat whole grains, such as whole-wheat pasta, brown rice, or whole-grain bread. Fill about one fourth of your plate with whole grains.  ? Eat or drink low-fat dairy products, such as skim milk or low-fat yogurt.  ? Avoid fatty cuts of meat, processed or cured meats, and poultry with skin. Fill about one fourth of your plate with lean proteins, such as fish, chicken without skin, beans, eggs, or tofu.  ? Avoid pre-made and processed foods. These tend to be higher in sodium, added sugar, and fat.  · Reduce your daily sodium intake. Most people with hypertension should eat less than 1,500 mg of sodium a day.  · Do not drink alcohol if:  ? Your health care provider tells you not to drink.  ? You are pregnant, may be pregnant, or are planning to become pregnant.  · If you drink alcohol:  ? Limit how much you use to:  § 0-1 drink a day for women.  § 0-2 drinks a day for men.  ? Be aware of how much alcohol is in your drink. In the [place] one drink equals one 12 oz bottle of beer (355 mL), one 5 oz glass of wine (148 mL), or one 1½ oz glass of hard liquor (44 mL).  Lifestyle    · Work with your health care provider to maintain a healthy body weight or to lose weight. Ask what an ideal weight is for you.  · Get at least 30 minutes of exercise most days of the week. Activities may include walking, swimming, or biking.  · Include exercise to  strengthen your muscles (resistance exercise), such as Pilates or lifting weights, as part of your weekly exercise routine. Try to do these types of exercises for 30 minutes at least 3 days a week.  · Do not use any products that contain nicotine or tobacco, such as cigarettes, e-cigarettes, and chewing tobacco. If you need help quitting, ask your health care provider.  · Monitor your blood pressure at home as told by your health care provider.  · Keep all follow-up visits as told by your health care provider. This is important.  Medicines  · Take over-the-counter and prescription medicines only as told by your health care provider. Follow directions carefully. Blood pressure medicines must be taken as prescribed.  · Do not skip doses of blood pressure medicine. Doing this puts you at risk for problems and can make the medicine less effective.  · Ask your health care provider about side effects or reactions to medicines that you should watch for.  Contact a health care provider if you:  · Think you are having a reaction to a medicine you are taking.  · Have headaches that keep coming back (recurring).  · Feel dizzy.  · Have swelling in your ankles.  · Have trouble with your vision.  Get help right away if you:  · Develop a severe headache or confusion.  · Have unusual weakness or numbness.  · Feel faint.  · Have severe pain in your chest or abdomen.  · Vomit repeatedly.  · Have trouble breathing.  Summary  · Hypertension is when the force of blood pumping through your arteries is too strong. If this condition is not controlled, it may put you at risk for serious complications.  · Your personal target blood pressure may vary depending on your medical conditions, your age, and other factors. For most people, a normal blood pressure is less than 120/80.  · Hypertension is treated with lifestyle changes, medicines, or a combination of both. Lifestyle changes include losing weight, eating a healthy, low-sodium diet,  exercising more, and limiting alcohol.  This information is not intended to replace advice given to you by your health care provider. Make sure you discuss any questions you have with your health care provider.  Document Released: 12/18/2006 Document Revised: 08/28/2019 Document Reviewed: 08/28/2019  Elsevier Patient Education © 2020 [employer]    Kidney Stones  Kidney stones are rock-like masses that form inside of the kidneys. Kidneys are organs that make pee (urine). A kidney stone may move into other parts of the urinary tract, including:  · The tubes that connect the kidneys to the bladder (ureters).  · The bladder.  · The tube that carries urine out of the body (urethra).  Kidney stones can cause very bad pain and can block the flow of pee. The stone usually leaves your body (passes) through your pee. You may need to have a doctor take out the stone.  What are the causes?  Kidney stones may be caused by:  · A condition in which certain glands make too much parathyroid hormone (primary hyperparathyroidism).  · A buildup of a type of crystals in the bladder made of a chemical called uric acid. The body makes uric acid when you eat certain foods.  · Narrowing (stricture) of one or both of the ureters.  · A kidney blockage that you were born with.  · Past surgery on the kidney or the ureters, such as gastric bypass surgery.  What increases the risk?  You are more likely to develop this condition if:  · You have had a kidney stone in the past.  · You have a family history of kidney stones.  · You do not drink enough water.  · You eat a diet that is high in protein, salt (sodium), or sugar.  · You are overweight or very overweight (obese).  What are the signs or symptoms?  Symptoms of a kidney stone may include:  · Pain in the side of the belly, right below the ribs (flank pain). Pain usually spreads (radiates) to the groin.  · Needing to pee often or right away (urgently).  · Pain when going pee  (urinating).  · Blood in your pee (hematuria).  · Feeling like you may vomit (nauseous).  · Vomiting.  · Fever and chills.  How is this treated?  Treatment depends on the size, location, and makeup of the kidney stones. The stones will often pass out of the body through peeing. You may need to:  · Drink more fluid to help pass the stone. In some cases, you may be given fluids through an IV tube put into one of your veins at the hospital.  · Take medicine for pain.  · Make changes in your diet to help keep kidney stones from coming back.  Sometimes, medical procedures are needed to remove a kidney stone. This may involve:  · A procedure to break up kidney stones using a beam of light (laser) or shock waves.  · Surgery to remove the kidney stones.  Follow these instructions at home:  Medicines  · Take over-the-counter and prescription medicines only as told by your doctor.  · Ask your doctor if the medicine prescribed to you requires you to avoid driving or using heavy machinery.  Eating and drinking  · Drink enough fluid to keep your pee pale yellow. You may be told to drink at least 8-10 glasses of water each day. This will help you pass the stone.  · If told by your doctor, change your diet. This may include:  ? Limiting how much salt you eat.  ? Eating more fruits and vegetables.  ? Limiting how much meat, poultry, fish, and eggs you eat.  · Follow instructions from your doctor about eating or drinking restrictions.  General instructions  · Collect pee samples as told by your doctor. You may need to collect a pee sample:  ? 24 hours after a stone comes out.  ? 8-12 weeks after a stone comes out, and every 6-12 months after that.  · Strain your pee every time you pee (urinate), for as long as told. Use the strainer that your doctor recommends.  · Do not throw out the stone. Keep it so that it can be tested by your doctor.  · Keep all follow-up visits as told by your doctor. This is important. You may need follow-up  tests.  How is this prevented?  To prevent another kidney stone:  · Drink enough fluid to keep your pee pale yellow. This is the best way to prevent kidney stones.  · Eat healthy foods.  · Avoid certain foods as told by your doctor. You may be told to eat less protein.  · Stay at a healthy weight.  Where to find more information  · National Kidney Foundation (NKF): www.kidney.org  · [facility] ([facility]): www.urologyhealth.org  Contact a doctor if:  · You have pain that gets worse or does not get better with medicine.  Get help right away if:  · You have a fever or chills.  · You get very bad pain.  · You get new pain in your belly (abdomen).  · You pass out (faint).  · You cannot pee.  Summary  · Kidney stones are rock-like masses that form inside of the kidneys.  · Kidney stones can cause very bad pain and can block the flow of pee.  · The stones will often pass out of the body through peeing.  · Drink enough fluid to keep your pee pale yellow.  This information is not intended to replace advice given to you by your health care provider. Make sure you discuss any questions you have with your health care provider.  Document Released: 06/05/2009 Document Revised: 05/05/2020 Document Reviewed: 05/05/2020  Elsevier Patient Education © 2020 [employer]

## 2021-01-08 ENCOUNTER — CLINICAL SUPPORT (OUTPATIENT)
Dept: FAMILY MEDICINE CLINIC | Facility: CLINIC | Age: 56
End: 2021-01-08

## 2021-01-08 PROCEDURE — 90471 IMMUNIZATION ADMIN: CPT | Performed by: FAMILY MEDICINE

## 2021-01-08 PROCEDURE — 90750 HZV VACC RECOMBINANT IM: CPT | Performed by: FAMILY MEDICINE

## 2021-03-03 RX ORDER — VENLAFAXINE HYDROCHLORIDE 75 MG/1
CAPSULE, EXTENDED RELEASE ORAL
Qty: 90 CAPSULE | Refills: 1 | Status: SHIPPED | OUTPATIENT
Start: 2021-03-03 | End: 2021-08-30

## 2021-04-30 RX ORDER — DOCUSATE SODIUM 100 MG
CAPSULE ORAL
Qty: 180 CAPSULE | Refills: 1 | Status: SHIPPED | OUTPATIENT
Start: 2021-04-30 | End: 2021-09-25 | Stop reason: SDUPTHER

## 2021-04-30 NOTE — TELEPHONE ENCOUNTER
Last visit:  10/8/20  Next visit: none  Last labs:10/8/20    Rx requested: Stool softener  Pharmacy: [facility]

## 2021-08-30 RX ORDER — VENLAFAXINE HYDROCHLORIDE 75 MG/1
CAPSULE, EXTENDED RELEASE ORAL
Qty: 90 CAPSULE | Refills: 3 | Status: SHIPPED | OUTPATIENT
Start: 2021-08-30 | End: 2023-03-17 | Stop reason: SDUPTHER

## 2021-09-27 NOTE — TELEPHONE ENCOUNTER
Rx Refill Note  Requested Prescriptions     Pending Prescriptions Disp Refills   • docusate sodium (Stool Softener) 100 MG capsule 180 capsule 1      Last office visit with prescribing clinician: 10/8/2020      Next office visit with prescribing clinician: Visit date not found     Comprehensive Metabolic Panel (08/27/2020 10:40)  POC Lipid Panel (10/08/2020 08:49)         Juany Herrera, ALFIE  09/27/21, 08:43 EDT

## 2021-09-28 RX ORDER — DOCUSATE SODIUM 100 MG/1
100 CAPSULE, LIQUID FILLED ORAL 2 TIMES DAILY PRN
Qty: 180 CAPSULE | Refills: 1 | Status: SHIPPED | OUTPATIENT
Start: 2021-09-28 | End: 2022-07-11

## 2021-11-01 NOTE — TELEPHONE ENCOUNTER
Rx Refill Note  Requested Prescriptions     Pending Prescriptions Disp Refills   • alendronate (FOSAMAX) 70 MG tablet [Pharmacy Med Name: ALENDRONATE SODIUM TABS 4'S 70MG] 88 tablet 3     Sig: TAKE 1 TABLET EVERY 7 DAYS      Last office visit with prescribing clinician: 10/8/2020      Next office visit with prescribing clinician: Visit date not found     POC Lipid Panel (10/08/2020 08:49)         Aster Jordan, RT  11/01/21, 12:50 EDT

## 2021-11-02 RX ORDER — ALENDRONATE SODIUM 70 MG/1
TABLET ORAL
Qty: 4 TABLET | Refills: 0 | Status: SHIPPED | OUTPATIENT
Start: 2021-11-02 | End: 2021-12-28

## 2021-11-11 ENCOUNTER — OFFICE VISIT (OUTPATIENT)
Dept: FAMILY MEDICINE CLINIC | Facility: CLINIC | Age: 56
End: 2021-11-11

## 2021-11-11 VITALS
SYSTOLIC BLOOD PRESSURE: 147 MMHG | WEIGHT: 178 LBS | HEART RATE: 57 BPM | DIASTOLIC BLOOD PRESSURE: 74 MMHG | RESPIRATION RATE: 18 BRPM | BODY MASS INDEX: 29.66 KG/M2 | TEMPERATURE: 97.7 F | HEIGHT: 65 IN | OXYGEN SATURATION: 97 %

## 2021-11-11 DIAGNOSIS — E78.2 MIXED DYSLIPIDEMIA: Primary | ICD-10-CM

## 2021-11-11 DIAGNOSIS — I10 HTN, GOAL BELOW 130/80: ICD-10-CM

## 2021-11-11 DIAGNOSIS — R73.09 ELEVATED GLUCOSE: ICD-10-CM

## 2021-11-11 DIAGNOSIS — M81.0 AGE-RELATED OSTEOPOROSIS WITHOUT CURRENT PATHOLOGICAL FRACTURE: ICD-10-CM

## 2021-11-11 DIAGNOSIS — E55.9 VITAMIN D DEFICIENCY: ICD-10-CM

## 2021-11-11 DIAGNOSIS — Z00.00 WELL ADULT EXAM: ICD-10-CM

## 2021-11-11 DIAGNOSIS — Z12.31 SCREENING MAMMOGRAM FOR BREAST CANCER: ICD-10-CM

## 2021-11-11 DIAGNOSIS — Z23 NEED FOR INFLUENZA VACCINATION: ICD-10-CM

## 2021-11-11 PROCEDURE — 81003 URINALYSIS AUTO W/O SCOPE: CPT | Performed by: FAMILY MEDICINE

## 2021-11-11 PROCEDURE — 85025 COMPLETE CBC W/AUTO DIFF WBC: CPT | Performed by: FAMILY MEDICINE

## 2021-11-11 PROCEDURE — 90686 IIV4 VACC NO PRSV 0.5 ML IM: CPT | Performed by: FAMILY MEDICINE

## 2021-11-11 PROCEDURE — 90471 IMMUNIZATION ADMIN: CPT | Performed by: FAMILY MEDICINE

## 2021-11-11 PROCEDURE — 99396 PREV VISIT EST AGE 40-64: CPT | Performed by: FAMILY MEDICINE

## 2021-11-11 PROCEDURE — 83036 HEMOGLOBIN GLYCOSYLATED A1C: CPT | Performed by: FAMILY MEDICINE

## 2021-11-11 PROCEDURE — 82306 VITAMIN D 25 HYDROXY: CPT | Performed by: FAMILY MEDICINE

## 2021-11-11 PROCEDURE — 80053 COMPREHEN METABOLIC PANEL: CPT | Performed by: FAMILY MEDICINE

## 2021-11-11 PROCEDURE — 80061 LIPID PANEL: CPT | Performed by: FAMILY MEDICINE

## 2021-11-11 PROCEDURE — 36415 COLL VENOUS BLD VENIPUNCTURE: CPT | Performed by: FAMILY MEDICINE

## 2021-11-11 RX ORDER — LOSARTAN POTASSIUM 25 MG/1
25 TABLET ORAL DAILY
Qty: 30 TABLET | Refills: 0 | Status: SHIPPED | OUTPATIENT
Start: 2021-11-11 | End: 2021-11-28 | Stop reason: SDUPTHER

## 2021-11-12 LAB
25(OH)D3 SERPL-MCNC: 52.7 NG/ML
ALBUMIN SERPL-MCNC: 4.6 G/DL (ref 3.5–5.2)
ALBUMIN/GLOB SERPL: 1.6 G/DL
ALP SERPL-CCNC: 67 U/L (ref 39–117)
ALT SERPL W P-5'-P-CCNC: 29 U/L (ref 1–33)
ANION GAP SERPL CALCULATED.3IONS-SCNC: 8.4 MMOL/L (ref 5–15)
AST SERPL-CCNC: 30 U/L (ref 1–32)
BASOPHILS # BLD AUTO: 0.06 10*3/MM3 (ref 0–0.2)
BASOPHILS NFR BLD AUTO: 1.3 % (ref 0–1.5)
BILIRUB SERPL-MCNC: 0.4 MG/DL (ref 0–1.2)
BILIRUB UR QL STRIP: NEGATIVE
BUN SERPL-MCNC: 15 MG/DL (ref 6–20)
BUN/CREAT SERPL: 18.1 (ref 7–25)
CALCIUM SPEC-SCNC: 9.4 MG/DL (ref 8.6–10.5)
CHLORIDE SERPL-SCNC: 104 MMOL/L (ref 98–107)
CHOLEST SERPL-MCNC: 247 MG/DL (ref 0–200)
CLARITY UR: CLEAR
CO2 SERPL-SCNC: 27.6 MMOL/L (ref 22–29)
COLOR UR: YELLOW
CREAT SERPL-MCNC: 0.83 MG/DL (ref 0.57–1)
DEPRECATED RDW RBC AUTO: 43.8 FL (ref 37–54)
EOSINOPHIL # BLD AUTO: 0.31 10*3/MM3 (ref 0–0.4)
EOSINOPHIL NFR BLD AUTO: 6.9 % (ref 0.3–6.2)
ERYTHROCYTE [DISTWIDTH] IN BLOOD BY AUTOMATED COUNT: 12.8 % (ref 12.3–15.4)
GFR SERPL CREATININE-BSD FRML MDRD: 71 ML/MIN/1.73
GLOBULIN UR ELPH-MCNC: 2.9 GM/DL
GLUCOSE SERPL-MCNC: 75 MG/DL (ref 65–99)
GLUCOSE UR STRIP-MCNC: NEGATIVE MG/DL
HBA1C MFR BLD: 5 % (ref 3.5–5.6)
HCT VFR BLD AUTO: 41.3 % (ref 34–46.6)
HDLC SERPL-MCNC: 97 MG/DL (ref 40–60)
HGB BLD-MCNC: 13.8 G/DL (ref 12–15.9)
HGB UR QL STRIP.AUTO: NEGATIVE
IMM GRANULOCYTES # BLD AUTO: 0.01 10*3/MM3 (ref 0–0.05)
IMM GRANULOCYTES NFR BLD AUTO: 0.2 % (ref 0–0.5)
KETONES UR QL STRIP: NEGATIVE
LDLC SERPL CALC-MCNC: 140 MG/DL (ref 0–100)
LDLC/HDLC SERPL: 1.41 {RATIO}
LEUKOCYTE ESTERASE UR QL STRIP.AUTO: NEGATIVE
LYMPHOCYTES # BLD AUTO: 1.73 10*3/MM3 (ref 0.7–3.1)
LYMPHOCYTES NFR BLD AUTO: 38.7 % (ref 19.6–45.3)
MCH RBC QN AUTO: 31.6 PG (ref 26.6–33)
MCHC RBC AUTO-ENTMCNC: 33.4 G/DL (ref 31.5–35.7)
MCV RBC AUTO: 94.5 FL (ref 79–97)
MONOCYTES # BLD AUTO: 0.48 10*3/MM3 (ref 0.1–0.9)
MONOCYTES NFR BLD AUTO: 10.7 % (ref 5–12)
NEUTROPHILS NFR BLD AUTO: 1.88 10*3/MM3 (ref 1.7–7)
NEUTROPHILS NFR BLD AUTO: 42.2 % (ref 42.7–76)
NITRITE UR QL STRIP: NEGATIVE
NRBC BLD AUTO-RTO: 0 /100 WBC (ref 0–0.2)
PH UR STRIP.AUTO: 6 [PH] (ref 5–8)
PLATELET # BLD AUTO: 256 10*3/MM3 (ref 140–450)
PMV BLD AUTO: 11 FL (ref 6–12)
POTASSIUM SERPL-SCNC: 3.8 MMOL/L (ref 3.5–5.2)
PROT SERPL-MCNC: 7.5 G/DL (ref 6–8.5)
PROT UR QL STRIP: NEGATIVE
RBC # BLD AUTO: 4.37 10*6/MM3 (ref 3.77–5.28)
SODIUM SERPL-SCNC: 140 MMOL/L (ref 136–145)
SP GR UR STRIP: 1.01 (ref 1–1.03)
TRIGL SERPL-MCNC: 64 MG/DL (ref 0–150)
UROBILINOGEN UR QL STRIP: NORMAL
VLDLC SERPL-MCNC: 10 MG/DL (ref 5–40)
WBC # BLD AUTO: 4.47 10*3/MM3 (ref 3.4–10.8)

## 2021-11-29 RX ORDER — LOSARTAN POTASSIUM 25 MG/1
25 TABLET ORAL DAILY
Qty: 90 TABLET | Refills: 1 | Status: SHIPPED | OUTPATIENT
Start: 2021-11-29 | End: 2022-06-09

## 2021-12-27 NOTE — TELEPHONE ENCOUNTER
Rx Refill Note  Requested Prescriptions     Pending Prescriptions Disp Refills   • alendronate (FOSAMAX) 70 MG tablet [Pharmacy Med Name: ALENDRONATE SODIUM TABS 4'S 70MG] 4 tablet 12     Sig: TAKE 1 TABLET EVERY 7 DAYS (NEED APPOINTMENT)      Last office visit with prescribing clinician: 11/11/2021      Next office visit with prescribing clinician: Visit date not found     Vitamin D 25 hydroxy (11/11/2021 10:06)         Aster Jordan, RT  12/27/21, 12:22 EST

## 2021-12-28 RX ORDER — ALENDRONATE SODIUM 70 MG/1
TABLET ORAL
Qty: 12 TABLET | Refills: 0 | Status: SHIPPED | OUTPATIENT
Start: 2021-12-28 | End: 2022-07-11

## 2022-01-21 ENCOUNTER — TRANSCRIBE ORDERS (OUTPATIENT)
Dept: FAMILY MEDICINE CLINIC | Facility: CLINIC | Age: 57
End: 2022-01-21

## 2022-01-21 DIAGNOSIS — Z13.6 ENCOUNTER FOR SCREENING FOR VASCULAR DISEASE: Primary | ICD-10-CM

## 2022-02-23 ENCOUNTER — TELEPHONE (OUTPATIENT)
Dept: FAMILY MEDICINE CLINIC | Facility: CLINIC | Age: 57
End: 2022-02-23

## 2022-03-04 ENCOUNTER — APPOINTMENT (OUTPATIENT)
Dept: CARDIOLOGY | Facility: HOSPITAL | Age: 57
End: 2022-03-04

## 2022-03-04 ENCOUNTER — APPOINTMENT (OUTPATIENT)
Dept: CT IMAGING | Facility: HOSPITAL | Age: 57
End: 2022-03-04

## 2022-03-24 ENCOUNTER — E-VISIT (OUTPATIENT)
Dept: FAMILY MEDICINE CLINIC | Facility: TELEHEALTH | Age: 57
End: 2022-03-24

## 2022-03-24 PROCEDURE — BRIGHTMDVISIT: Performed by: NURSE PRACTITIONER

## 2022-03-24 NOTE — E-VISIT TREATED
Chief Complaint: Bladder infection (UTI)   Patient introduction   Patient is 57-year-old female who reports dysuria, urinary frequency, and urinary urgency for 1-3 days.   Urine is described as cloudy with a strong or pungent odor.   Patient did not request an excuse note.   General presentation   Denies fever. Denies nausea and vomiting.   Denies stomach/pelvic pain.   Denies back pain.   Denies flank pain. Denies difficulty starting, stopping, or delaying urination.   Denies use of OTC acetaminophen, ibuprofen, or phenazopyridine for current symptoms.   Reports previous history of UTI. Current symptoms feel exactly the same as previous UTIs. Reports receiving treatment for UTI 0 times in last year.   Uncertain whether treated past UTIs with any of the following: amoxicillin-clavulanate, cefdinir, cefuroxime, cephalexin, ciprofloxacin, fosfomycin, nitrofurantoin monohydrate, TMP/SMX, or trimethoprim.   Reports developing yeast infections as a result of taking antibiotics for past UTIs.   Reports sexual activity in the past week. Denies current diaphragm use. Denies unprotected sexual intercourse with a new partner in the last 2 weeks. Denies exposure to sexually transmitted infections in the last month.   Patient denies current treatment for diabetes mellitus.   Denies the following red flags:    Recent hospitalizations or nursing home care (last 3 months)    History of renal failure    History of pyelonephritis    Recent history of nephrolithiasis (within the last year)    Recent history of urological instrumentation    Anatomic abnormalities of the urinary tract    Abnormal vaginal discharge    Visible vaginal sores    Pain with sexual intercourse    Abnormal vaginal bleeding or spotting   Pregnancy/menstrual status/breastfeeding:   Patient is postmenopausal.   Current medications   Reports taking A/Fish Oil and magnesium (as magnesium chloride).   Medication allergies    Penicillins   Medication  contraindication review   Denies currently taking ACE inhibitors and ARBs.   Denies history of anaphylactic reaction to beta-lactams; folate deficiency; G6PD deficiency; arrhythmia; coronary artery disease; megaloblastic anemia; mononucleosis; myasthenia gravis; cholestatic jaundice; oliguria/anuria; and TMP/SMX-associated thrombocytopenia.   No known history of amoxicillin-clavulanate-associated cholestatic jaundice or nitrofurantoin-associated cholestatic jaundice.   Past medical history   Immune conditions: Denies immunocompromising conditions. Denies history of cancer.   Assessment   Uncomplicated acute UTI.   This is the likely diagnosis based on patient's reported symptoms and history, including:    Previous history of UTI    Current symptoms are exactly the same as previous UTIs    Urine described as cloudy with a strong odor    Recent history of delaying urination   Plan   Medications:    fluconazole 150 mg tablet RX 150mg 1 tab PO once 1d as a single dose for vaginal yeast infection. Repeat in 72 hours if symptoms persist. Amount is 2 tab.    phenazopyridine 200 mg tablet RX 200mg 1 tab PO tid PRN 2d for pain or discomfort associated with your condition. Amount is 6 tab.    nitrofurantoin monohydrate/macrocrystals 100 mg capsule RX 100mg 1 cap PO q12h 5d for infection. This medication is an antibiotic. Take it exactly as directed. You must finish the entire course of medication, even if you feel better after taking the first few doses. Amount is 10 cap.   The patient's prescriptions will be sent to:   [id number]   [address] [place] [id number]   Phone: [phone]     Fax: [phone]   Education:    Condition and causes    Prevention    Treatment and self-care    When to call provider   Follow-up:   Patient to follow up as needed for progression or lack of improvement in symptoms within 3d.      ----------   Electronically signed by LORENZO Quiroga on 2022-03-24 at  14:42PM   ----------   Patient Interview Transcript:   Knowing about your anatomy is important for diagnosing and treating UTIs. The gender we have on file for you is female, but we realize that this might not tell the whole story. Would you like to tell us more about your anatomy?    No   Not selected:    Yes   Which of these symptoms do you have? Select all that apply.    Pain or burning while urinating    Frequent urination    Sudden urge to urinate   How long have you had these symptoms? Select one.    1 to 3 days   Not selected:    Less than 24 hours    4 to 6 days    7 to 10 days    More than 10 days   Since your current symptoms started, has it been difficult to start, stop, or delay urination? Select one.    No   Not selected:    Yes    I'm not sure   What color is your urine? Select one.    Cloudy   Not selected:    Clear    Yellow    Pink or red    I'm not sure   Does your urine smell strange (like ammonia) or stronger than usual? Select one.    Yes   Not selected:    No   Do your symptoms include any of these? Select all that apply.    No   Not selected:    Fever    Nausea    Vomiting    Pain, pressure, or discomfort in the lower abdomen    Back pain   Do you have any flank pain? The flank is the side of the body between the ribs and the hips.    No   Not selected:    Yes, in my left flank    Yes, in my right flank    Yes, in both my left and right flanks   Do you have any vaginal symptoms? Select all that apply.    No   Not selected:    Abnormal vaginal itching    Unscheduled or abnormal vaginal bleeding or spotting    Pain during sex    Visible sores on the vagina    Abnormal vaginal discharge   In the past 2 weeks, have you had a medical device or instrument placed in your urinary tract? Examples include catheters, stents, and nephrostomy tubes. Select one.    No   Not selected:    Yes   Have you recently been hospitalized or been a resident of a nursing home or other long-term care facility? This  doesn't include emergency room (ER) visits. Select one.    No   Not selected:    Yes, within the last 2 weeks    Yes, within the last 3 months   Have you ever had severe problems with your kidneys, such as kidney failure? Select one.    No   Not selected:    Yes   Have you ever had a kidney infection (pyelonephritis)? Select one.    No   Not selected:    Yes, within the last year    Yes, over a year ago    I'm not sure   Have you ever had kidney stones? Select one.    Yes, over a year ago   Not selected:    Yes, within the last year    No    I'm not sure   Have you ever been diagnosed with any of these? Select all that apply.    No   Not selected:    Urinary reflux    Bladder diverticula    Single (or horseshoe) kidney    Duplicated urethra    I'm not sure   Have you recently held your urine for a long time after you felt the urge to go? Select one.    Yes   Not selected:    No   Have you recently avoided eating or drinking so you wouldn't have the urge to urinate as often? Select one.    No   Not selected:    Yes   Do you currently use a diaphragm? Select one.    No   Not selected:    Yes   Have you gone through menopause? Select one.    Yes   Not selected:    No    I'm going through it now   Have you had sexual intercourse in the past week? Recent sexual intercourse is a risk factor for urinary tract infections. Select one.    Yes   Not selected:    No   Have you been exposed to a sexually transmitted infection (STI or STD) in the last month? Examples include chlamydia, gonorrhea, trichomoniasis, and herpes. Select one.    No, not that I know of   Not selected:    Yes    I'm not sure   Have you had unprotected sexual intercourse with a new partner in the last 2 weeks? Select one.    No   Not selected:    Yes   Have you traveled outside of the [place] in the last 6 months? Select one.    No   Not selected:    Yes   Have you taken any of these non-prescription medications for your current symptoms?  Non-prescription medications are the kind you can buy without a prescription. Select any that may apply.    No   Not selected:    Acetaminophen (Tylenol)    Ibuprofen (Advil, Motrin)    Phenazopyridine (Azo, Pyridium, Baridium, Uricalm, Uristat)   Have you ever had a urinary tract infection (UTI) before? A UTI is often called a bladder infection. Select one.    Yes   Not selected:    No    I'm not sure   How much do your current symptoms feel like past UTIs? Select one.    Exactly the same   Not selected:    Mostly the same    Somewhat the same    Totally different   In the past year, how many times have you taken antibiotics for a UTI? Select one.    0   Not selected:    1 to 3    4 or more   Which of these antibiotics have you taken for UTIs in the past? Select all that apply.    I'm not sure   Not selected:    Amoxicillin-clavulanate (Augmentin)    Cefdinir (Omnicef)    Cefuroxime (Ceftin)    Cephalexin (Daxbia, Keflex)    Ciprofloxacin (Cipro)    Fosfomycin    Nitrofurantoin (Macrobid)    TMP/SMX (Bactrim, Bactrim DS, Sulfatrim)    Trimethoprim (Primsol)    Other (specify)   Have you ever developed a yeast infection as a result of taking antibiotics? Select one.    Yes   Not selected:    No    I'm not sure   UTIs may be more serious when other factors are present. Let's address those now. Are you currently being treated for type 1 or type 2 diabetes? Select one.    No   Not selected:    Yes   Do you have any of these conditions that can affect the immune system? Scroll to see all options. Select all that apply.    None of these   Not selected:    History of bone marrow transplant    Chronic kidney disease    Chronic liver disease (including cirrhosis)    HIV/AIDS    Inflammatory bowel disease (Crohn's disease or ulcerative colitis)    Lupus    Moderate to severe plaque psoriasis    Multiple sclerosis    Rheumatoid arthritis    Sickle cell anemia    Alpha or beta thalassemia    History of solid organ transplant  (kidney, liver, or heart)    History of spleen removal    An autoimmune disorder not listed here    A condition requiring treatment with long-term use of oral steroids (such as prednisone, prednisolone, or dexamethasone)   Have you ever been diagnosed with cancer? Select one.    No   Not selected:    Yes, I have cancer now    Yes, but I'm in remission   These last few questions will help us create the right treatment plan for you. Are you currently being treated for any of these conditions? Select all that apply.    No   Not selected:    Anaphylactic reaction to beta-lactam antibiotics (penicillins, cephalosporins, carbapenems)    Folate deficiency    G6PD deficiency    Heart arrhythmia    Heart disease    Megaloblastic anemia    Mono (mononucleosis)    Myasthenia gravis    Oliguria or anuria    TMP/SMX-associated low blood platelet count (thrombocytopenia)   Have you ever had jaundice as a result of taking amoxicillin-clavulanate (Augmentin) or nitrofurantoin (Macrobid)? Select all that apply.    No   Not selected:    Yes, from amoxicillin-clavulanate (Augmentin)    Yes, from nitrofurantoin (Macrobid)   Are you taking any of these medications? Select all that apply.    No   Not selected:    An ACE inhibitor such as lisinopril, enalapril, captopril, or benazepril    An angiotensin II receptor blocker (ARB) such as candesartan, irbesartan, losartan, or valsartan   Are you taking any other medications or supplements? On the next screen, you need to list all vitamins, supplements, non-prescription medications (such as aspirin or Aleve), and prescription medications that you're taking. Select one.    Yes   Not selected:    Yes, but I'm not sure what they are    No   Have you ever had an allergic or bad reaction to any medication? Select one.    Yes   Not selected:    No   Have you had an allergic or bad reaction to any of these medications? Scroll to see all options. Select all that apply.    Any antibiotic ending with  "\"-cillin,\" such as penicillin, amoxicillin, ampicillin, dicloxacillin, nafcillin, or piperacillin (Brands include Augmentin, Unasyn, and Zosyn)   Not selected:    Any antibiotic starting with \"cef-,\" such as cefazolin, cefdinir, cefuroxime, ceftriaxone, ceftazidime, cefepime, or cephalexin (Brands include Ancef, Ceftin, Fortaz, Keflex, Maxipime, Rocephin, and Simplicef)    Any antibiotic ending with \"-floxacin,\" such as ciprofloxacin, gemifloxacin, levofloxacin, moxifloxacin, or ofloxacin (Brands include Factive, Cipro, Floxin, and Levaquin)    Nitrofurantoin (brand names Furadantin, Macrodantin, Macrobid)    Sulfamethoxazole-trimethoprim (brand names include Septra, Bactrim) or any other sulfa drug    Fluconazole, itraconazole, or terconazole (brand name Diflucan)    Trimethoprim (brand name Primsol)    I'm not sure    No   Have you had an allergic or bad reaction to any of these non-prescription medications? Select all that apply.    No   Not selected:    Acetaminophen (Tylenol)    Ibuprofen (Advil, Motrin, Midol)    Phenazopyridine (Azo, Baridium, Uricalm, Uristat)   Do you need a doctor's note? A doctor's note confirms that you received care today and states when you can return to school or work. It does not contain information about your diagnosis or treatment plan. Your provider will make the final decision on whether to give you a doctor's note. Doctor's notes CANNOT be backdated. Select one.    No   Not selected:    Today only (1 day)   Is there anything else you'd like to tell us about your symptoms?   The patient did not enter any additional information.   ----------   Medical history   The following information was received from the EMR on March 24, 2022.   Allergies:    AMOXICILLIN   - Allergy Type: Medication   - Reaction: Hives, Itching, Swelling   - Severity: Severe   - Clinical Status: Active   - Verification Status: Confirmed   Medications:    DOCUSATE SODIUM 100 MG PO CAPS   - Route: Oral   - Start " Date: September 28, 2021   - End Date: None   - Status: Active    NON FORMULARY   - Route:   - Start Date: November 11, 2021   - End Date: None   - Status: Active    LOSARTAN POTASSIUM 25 MG PO TABS   - Route: Oral   - Start Date: November 29, 2021   - End Date: None   - Status: Active    ALENDRONATE SODIUM 70 MG PO TABS   - Route:   - Start Date: December 28, 2021   - End Date: None   - Status: Active    OSTEO BI-FLEX JOINT SHIELD PO TABS   - Route: Oral   - Start Date: September 10, 2019   - End Date: None   - Status: Active    VITAMIN D3 125 MCG (5000 UT) PO CAPS   - Route: Oral   - Start Date: September 10, 2019   - End Date: None   - Status: Active    VENLAFAXINE HCL ER 75 MG PO CP24   - Route:   - Start Date: August 30, 2021   - End Date: None   - Status: Active    LORATADINE 10 MG PO TABS   - Route: Oral   - Start Date: September 26, 2019   - End Date: None   - Status: Active   Problem list:    Anemia   - Category: Problem List Item   - Health Status:   - Start Date: September 10, 2019   - End Date: None   - Status: Active    Colon cancer screening   - Category: Problem List Item   - Health Status:   - Start Date: September 10, 2019   - End Date: None   - Status: Active    Encounter for screening for other viral diseases   - Category: Problem List Item   - Health Status:   - Start Date: September 10, 2019   - End Date: None   - Status: Active    Visit for screening mammogram   - Category: Problem List Item   - Health Status:   - Start Date: September 10, 2019   - End Date: None   - Status: Active    Difficult or painful urination   - Category: Problem List Item   - Health Status:   - Start Date: September 10, 2019   - End Date: None   - Status: Active    Dry eye syndrome   - Category: Problem List Item   - Health Status:   - Start Date: September 10, 2019   - End Date: None   - Status: Active    Dysthymia   - Category: Problem List Item   - Health Status:   - Start Date: September 10, 2019   - End Date:  None   - Status: Active    Elevated blood pressure reading without diagnosis of hypertension   - Category: Problem List Item   - Health Status:   - Start Date: September 10, 2019   - End Date: None   - Status: Active    Encounter for general adult medical examination without abnormal findings   - Category: Problem List Item   - Health Status:   - Start Date: September 10, 2019   - End Date: None   - Status: Active    Encounter for screening for malignant neoplasm of breast   - Category: Problem List Item   - Health Status:   - Start Date: September 10, 2019   - End Date: None   - Status: Active    Fatigue   - Category: Problem List Item   - Health Status:   - Start Date: September 10, 2019   - End Date: None   - Status: Active    Former smoker   - Category: Problem List Item   - Health Status:   - Start Date: September 10, 2019   - End Date: None   - Status: Active    Generalized anxiety disorder   - Category: Problem List Item   - Health Status:   - Start Date: September 10, 2019   - End Date: None   - Status: Active    Goiter   - Category: Problem List Item   - Health Status:   - Start Date: September 10, 2019   - End Date: None   - Status: Active    Increased frequency of urination   - Category: Problem List Item   - Health Status:   - Start Date: September 10, 2019   - End Date: None   - Status: Active    Microscopic hematuria   - Category: Problem List Item   - Health Status:   - Start Date: September 10, 2019   - End Date: None   - Status: Active    Osteoporosis   - Category: Problem List Item   - Health Status:   - Start Date: September 10, 2019   - End Date: None   - Status: Active    Other abnormal and inconclusive findings on diagnostic imaging of breast   - Category: Problem List Item   - Health Status:   - Start Date: September 10, 2019   - End Date: None   - Status: Active    Sinusitis   - Category: Problem List Item   - Health Status:   - Start Date: September 10, 2019   - End Date: None   - Status:  Active    Sore throat   - Category: Problem List Item   - Health Status:   - Start Date: September 10, 2019   - End Date: None   - Status: Active    Ulnar neuropathy of left upper extremity   - Category: Problem List Item   - Health Status:   - Start Date: September 10, 2019   - End Date: None   - Status: Active    Vitamin D deficiency   - Category: Problem List Item   - Health Status:   - Start Date: September 10, 2019   - End Date: None   - Status: Active

## 2022-03-24 NOTE — EXTERNAL PATIENT INSTRUCTIONS
Diagnosis   Urinary tract infection (UTI)   My name is Michelle Newsome. I'm a healthcare provider at [facility]. After reviewing your interview, I see you have a urinary tract infection (UTI).   Medications   Your pharmacy   [id number] [address] [place] [id number] [phone]     Prescription      Fluconazole (150mg): Take 1 tablet by mouth once for 1 day as a single dose. Use this medication only if you develop a yeast infection. If yeast infection symptoms are still present 3 days after taking the first tablet, take the second tablet.      Phenazopyridine (200mg): Take 1 tablet by mouth three times a day as needed for 2 days for pain or discomfort associated with your condition.      Nitrofurantoin monohydrate/macrocrystalline (100mg): Take 1 tablet by mouth every 12 hours for 5 days for infection. This medication is an antibiotic. Take it exactly as directed. You must finish the entire course of medication, even if you feel better after taking the first few doses.    I've given you a prescription dose of phenazopyridine. If it's more affordable or convenient, you may use the equivalent amount of non-prescription phenazopyridine. For example, instead of taking one 200 mg phenazopyridine tablet, you may take two 95 mg phenazopyridine tablets.    Because you've developed yeast infections after taking antibiotics in the past, I've prescribed Diflucan (fluconazole). Diflucan is an oral antifungal that treats yeast infections. Use this medication only if you develop a yeast infection.   About your diagnosis   A UTI is an infection of one or more parts of the urinary tract, most commonly the bladder.   Most UTIs are caused by bacteria (usually E. coli.) that travel up the urethra and affect the bladder. I see that you have some common signs and symptoms of a UTI:    Pain or burning while urinating    Frequent urination    Sudden urge to urinate    Symptoms that feel a lot like  past UTIs    Cloudy colored urine    Strange or strong smelling urine    Symptoms that began after sexual intercourse   Fortunately, most UTIs aren't serious, and they're easily treated with antibiotics. Make sure you take all of the antibiotic pills given to you. Otherwise, the UTI might come back.   What to expect   If you follow this treatment plan, you should start to feel better within 1 to 2 days.   When to seek care   Call us at [phone]   with any sudden or unexpected symptoms.    Symptoms that don't improve or get worse in the next 48 hours    Fever that goes above 101F or lasts longer than 24 hours    Shaking or chills    Nausea or vomiting    Severe flank pain (pain in your back or side)   Other treatment    Rest and drink plenty of water    Urinate frequently and when you first feel the urge    Place a heating pad on your back or stomach to help relieve some of the discomfort   Prevention    Drink a lot of liquids to help flush bacteria from your system. Water is best. Try for six to eight, 8-ounce glasses a day on a regular basis.    Urinate often and when you first feel the urge. Bacteria can grow when urine stays in the bladder too long. Urinate after sex to flush away bacteria.    After using the toilet, always wipe from front to back. This step is most important after a bowel movement. Wiping from front to back prevents bacteria normally found in stool from entering the urinary tract.   Your provider   Your diagnosis was provided by Michelle Newsome, a member of your trusted care team at [facility].   If you have any questions, call us at [phone]  .

## 2022-05-04 ENCOUNTER — OFFICE VISIT (OUTPATIENT)
Dept: ORTHOPEDIC SURGERY | Facility: CLINIC | Age: 57
End: 2022-05-04

## 2022-05-04 VITALS — HEIGHT: 65 IN | BODY MASS INDEX: 28.66 KG/M2 | WEIGHT: 172 LBS

## 2022-05-04 DIAGNOSIS — S83.222A PERIPHERAL TEAR OF MEDIAL MENISCUS OF LEFT KNEE AS CURRENT INJURY, INITIAL ENCOUNTER: Primary | ICD-10-CM

## 2022-05-04 PROCEDURE — 99204 OFFICE O/P NEW MOD 45 MIN: CPT | Performed by: ORTHOPAEDIC SURGERY

## 2022-05-04 NOTE — PROGRESS NOTES
"Chief Complaint  Establish Care of the Left Knee    Subjective    History of Present Illness      Anali Julio is a 57 y.o. female who presents to [facility] for left knee pain and discomfort.  History of Present Illness this is a patient who twisted her knee a few weeks ago and she fell.  Since that time she has had medial sided joint line pain and discomfort.  She has difficulty with ambulation and difficulty with squatting on the ground.  Walking on an incline surface of a regular ground bothers the patient significantly.  She loves to hike and ice.  Time and is unable to do that because of the pain and discomfort and clicking and popping on the medial aspect of the knee.  She works as an  and her job is sedentary and desk/office job.  She does not have any instability of the knee.  It is my feeling that she has a meniscus tear that is most likely causing her symptoms and is going to need to be addressed with getting an MRI and possible surgery.  Pain Location:  LEFT knee  Radiation: none  Quality: aching, throbbing  Intensity/Severity: varies between mild and severe  Duration: a few weeks  Progression of symptoms: yes, progressive worsening  Onset quality: sudden  Timing: intermittent  Aggravating Factors: kneeling, squatting  Alleviating Factors: NSAIDs  Previous Episodes: none mentioned  Associated Symptoms: pain, clicking/popping, decreased strength  ADLs Affected: ambulating, work related activities, recreational activities/sports  Previous Treatment: NSAIDs       Objective   Vital Signs:   Ht 165.1 cm (65\")   Wt 78 kg (172 lb)   BMI 28.62 kg/m²     Physical Exam  Physical Exam  Vitals signs and nursing note reviewed.   Constitutional:       Appearance: Normal appearance.   Pulmonary:      Effort: Pulmonary effort is normal.   Skin:     General: Skin is warm and dry.      Capillary Refill: Capillary refill takes less than 2 seconds.   Neurological:      General: No " focal deficit present.      Mental Status: He is alert and oriented to person, place, and time. Mental status is at baseline.   Psychiatric:         Mood and Affect: Mood normal.         Behavior: Behavior normal.         Thought Content: Thought content normal.         Judgment: Judgment normal.     Ortho Exam   Left knee (meniscus). The knee joint shows effusion with some thickening of the synovial membrane. There is tenderness over the meniscus. Apley's grinding test is positive over the joint line. Claudia's sign is positive with increased pain on torsional testing. There is a distinct click in mid flexion. Range of motion is from 0-110 degrees of flexion. No instability on medial or lateral testing. Anterior and posterior drawer testing is negative. Lachman test is negative. Joint line tenderness is present to direct palpation. There is some tenderness over the medial face of the tibia just distal to the joint line. The dorsalis pedis and posterior tibial artery pulses are palpable. Common peroneal nerve function is well preserved. Gait is cautious and somewhat antalgic. Full extension causes the patient to have quite a bit of pain and discomfort.             Result Review :   The following data was reviewed by: Wade Mcdonough MD on 05/04/2022:  Radiologic studies - see below for interpretation  LEFT knee xrays  weightbearing/standing ap/lateral views were performed at [facility] on 04/22/22. Images were independently viewed and interpreted by myself, my impression as follows:      left Knee X-Ray  Indication: Evaluation of pain and discomfort in the knee    AP, Lateral views  Findings: Early degenerative changes slight narrowing of the medial joint space and a suprapatellar effusion noted.  no bony lesion  Soft tissues within normal limits  decreased joint spaces  Hardware appropriately positioned not applicable      no prior studies available for comparison.    This patient's x-ray report was graded  according to the Kellgren and Tyler classification.  This took into account the joint space narrowing, osteophyte formation, sclerosis of the distal femur/proximal tibia along with deformity of those bones.  The findings were indicative of K L grade 2.    X-RAY was ordered and reviewed by Wade Mcdonough MD      Procedures           Assessment   Assessment and Plan    Diagnoses and all orders for this visit:    1. Peripheral tear of medial meniscus of left knee as current injury, initial encounter (Primary)          Follow Up   · Compression/brace to prevent the knee from buckling and giving out.  · Calcium and vitamin D for bone health.  · Tablet meloxicam 7.5 mg tab 1 p.o. nightly for pain swelling and discomfort.  · Glucosamine and chondroitin for joint health.  · My recommendations would be to go ahead and get an MRI of the knee for evaluation of medial meniscus tear.  · If she has a full-thickness tear of the medial meniscus she might need arthroscopic surgery.  · If she has a partial-thickness tear of the medial collateral ligament she can be treated with conservative, nonoperative management  · Rest, ice, compression, and elevation (RICE) therapy  · Stretching and strengthening exercises of the quads and hamstrings.  · OTC Tylenol 500-1000mg by mouth every 6 hours as needed for pain   · Follow up in 6 week(s)  • Patient was given instructions and counseling regarding her condition or for health maintenance advice. Please see specific information pulled into the AVS if appropriate.     Wade Mcdonough MD   Date of Encounter: 5/4/2022        EMR Dragon/Transcription disclaimer:  Much of this encounter note is an electronic transcription/translation of spoken language to printed text. The electronic translation of spoken language may permit erroneous, or at times, nonsensical words or phrases to be inadvertently transcribed; Although I have reviewed the note for such errors, some may still exist.

## 2022-05-12 PROBLEM — S83.222A PERIPHERAL TEAR OF MEDIAL MENISCUS OF LEFT KNEE AS CURRENT INJURY: Status: ACTIVE | Noted: 2022-05-12

## 2022-05-27 ENCOUNTER — HOSPITAL ENCOUNTER (OUTPATIENT)
Dept: CARDIOLOGY | Facility: HOSPITAL | Age: 57
Discharge: HOME | End: 2022-05-27

## 2022-05-27 ENCOUNTER — HOSPITAL ENCOUNTER (OUTPATIENT)
Dept: CT IMAGING | Facility: HOSPITAL | Age: 57
Discharge: HOME | End: 2022-05-27

## 2022-05-27 DIAGNOSIS — Z13.6 ENCOUNTER FOR SCREENING FOR VASCULAR DISEASE: ICD-10-CM

## 2022-05-27 LAB
BH CV XLRA MEAS - MID AO DIAM: 1.5 CM
BH CV XLRA MEAS - PAD LEFT ABI PT: 1.14
BH CV XLRA MEAS - PAD LEFT ARM: 160 MMHG
BH CV XLRA MEAS - PAD LEFT LEG PT: 182 MMHG
BH CV XLRA MEAS - PAD RIGHT ABI PT: 1.16
BH CV XLRA MEAS - PAD RIGHT ARM: 150 MMHG
BH CV XLRA MEAS - PAD RIGHT LEG PT: 185 MMHG
BH CV XLRA MEAS LEFT DIST CCA EDV: -25.1 CM/SEC
BH CV XLRA MEAS LEFT DIST CCA PSV: -67 CM/SEC
BH CV XLRA MEAS LEFT ICA/CCA RATIO: 1.1
BH CV XLRA MEAS LEFT MID CCA PSV: 67 CM/SEC
BH CV XLRA MEAS LEFT MID ICA PSV: 76 CM/SEC
BH CV XLRA MEAS LEFT PROX ICA EDV: -29 CM/SEC
BH CV XLRA MEAS LEFT PROX ICA PSV: -75.7 CM/SEC
BH CV XLRA MEAS RIGHT DIST CCA EDV: 20.2 CM/SEC
BH CV XLRA MEAS RIGHT DIST CCA PSV: 73.8 CM/SEC
BH CV XLRA MEAS RIGHT ICA/CCA RATIO: 0.8
BH CV XLRA MEAS RIGHT MID CCA PSV: 74 CM/SEC
BH CV XLRA MEAS RIGHT MID ICA PSV: 62 CM/SEC
BH CV XLRA MEAS RIGHT PROX ICA EDV: -27.7 CM/SEC
BH CV XLRA MEAS RIGHT PROX ICA PSV: -61.9 CM/SEC
MAXIMAL PREDICTED HEART RATE: 164 BPM
STRESS TARGET HR: 139 BPM

## 2022-05-27 PROCEDURE — 93799 UNLISTED CV SVC/PROCEDURE: CPT

## 2022-05-27 PROCEDURE — 75571 CT HRT W/O DYE W/CA TEST: CPT

## 2022-06-09 RX ORDER — LOSARTAN POTASSIUM 25 MG/1
TABLET ORAL
Qty: 90 TABLET | Refills: 3 | Status: SHIPPED | OUTPATIENT
Start: 2022-06-09

## 2022-07-11 RX ORDER — ALENDRONATE SODIUM 70 MG/1
TABLET ORAL
Qty: 12 TABLET | Refills: 1 | Status: SHIPPED | OUTPATIENT
Start: 2022-07-11 | End: 2023-04-03

## 2022-07-11 RX ORDER — DOCUSATE SODIUM 100 MG/1
CAPSULE, LIQUID FILLED ORAL
Qty: 180 CAPSULE | Refills: 3 | Status: SHIPPED | OUTPATIENT
Start: 2022-07-11

## 2022-07-11 NOTE — TELEPHONE ENCOUNTER
Rx Refill Note  Requested Prescriptions     Pending Prescriptions Disp Refills   • docusate sodium (COLACE) 100 MG capsule [Pharmacy Med Name: DOCUSATE SODIUM CAPS 100MG] 180 capsule 3     Sig: TAKE 1 CAPSULE TWICE A DAY AS NEEDED FOR CONSTIPATION   • alendronate (FOSAMAX) 70 MG tablet [Pharmacy Med Name: ALENDRONATE SODIUM TABS 12'S 70MG] 12 tablet 3     Sig: TAKE 1 TABLET EVERY 7 DAYS (NEED APPOINTMENT)      Last office visit with prescribing clinician: 11/11/2021      Next office visit with prescribing clinician: Visit date not found     Lipid Panel (11/11/2021 10:06)         Aster Jordan, RT  07/11/22, 09:28 EDT

## 2023-03-16 ENCOUNTER — TELEPHONE (OUTPATIENT)
Dept: FAMILY MEDICINE CLINIC | Facility: CLINIC | Age: 58
End: 2023-03-16
Payer: COMMERCIAL

## 2023-03-16 NOTE — TELEPHONE ENCOUNTER
Patient sent this message through [employer].  She is a previous Dr. Foley patient and has new patient appt scheduled with Afsaneh on 3/28/2023.    I have an appointment scheduled for March 28 at the location on [address], but I will run out of my venlafaxine XR 75 MG 24 hr capsules next week and stopping this medication suddenly causes me to feel a little lightheaded. Is there a way to get enough to do me until my appointment? Sorry couldn’t find a way to message directly about this because she wasn’t on my selection list when I selected medication.       Thanks   Anali Julio

## 2023-03-17 RX ORDER — VENLAFAXINE HYDROCHLORIDE 75 MG/1
75 CAPSULE, EXTENDED RELEASE ORAL DAILY
Qty: 30 CAPSULE | Refills: 0 | Status: SHIPPED | OUTPATIENT
Start: 2023-03-17 | End: 2023-03-28 | Stop reason: SDUPTHER

## 2023-03-27 NOTE — PROGRESS NOTES
Chief Complaint  Chief Complaint   Patient presents with   • Establish Care   • Hypertension   • Depression        Subjective          Anali Julio is here today to establish care. The following problems were discussed:     Annual physical- Father- CAD, passed away of heart attack, HTN. Denies smoking. Drinks once weekly. Denies illegal drugs.     Anxiety- On effexor and controlled. Denies SI or HI.     HTN- Denies CP, SOA, dizziness, lightheadedness. Has been checking  usually systolically and bottom number 80-90's. has been Compliant on losartan. Has headache, likely allergy related.     Osteoporosis-On fosamax, last DEXA scan was osteopenia. Compliant on fosamax     Allergies- Controlled on claritin, this time of year does flonase as well.     Vitamin D defiency- Has had in the past and takes supplments.     Obesity- Does not have set exercise routine, does try to eat healthy     Numbness in her hands- bilateral and both her hands. She has had artery scan in within the past 2 years.       She is from this area   Previous PCP: Dr. Foley   Marital status-   Children- No  Works as [employer]   Exercise- irregularly  Diet- Eating well-balanced meals     Labs- Due   Colonoscopy- Up to date, last at age 51 due in 3 years   Mammogram- Due, last year   DEXA- Up to date   Papsmear-Due       Vaccines:  Flu- Up to date   Tdap- Up to date   Covid-19- Up to date     Dental exam- Up to date   Eye exam- Up to date        Review of Systems   Constitutional: Negative for chills and fever.   HENT: Negative for congestion.    Eyes: Negative.    Respiratory: Negative for shortness of breath.    Cardiovascular: Negative for chest pain.   Gastrointestinal: Negative for abdominal pain, nausea and vomiting.   Genitourinary: Negative for difficulty urinating.   Musculoskeletal: Negative for arthralgias.   Skin: Negative.    Neurological: Positive for headache. Negative for dizziness and light-headedness.  "  Psychiatric/Behavioral: Negative for self-injury, suicidal ideas and depressed mood. The patient is not nervous/anxious.         Objective   Vital Signs:   Vitals:    03/28/23 1501   BP: 130/83   Pulse:    Resp:    Temp:    SpO2:       Estimated body mass index is 30.62 kg/m² as calculated from the following:    Height as of this encounter: 165.1 cm (65\").    Weight as of this encounter: 83.5 kg (184 lb).    BMI is >= 25 and <30. (Overweight) The following options were offered after discussion;: exercise counseling/recommendations            Patient screened positive for depression based on a PHQ-9 score of 2 . Follow-up recommendations include: PCP managing depression.        Physical Exam           Physical Exam   Result Review :             Procedures       Assessment and Plan     Diagnoses and all orders for this visit:    1. Primary hypertension (Primary)  Assessment & Plan:  BP: 145/69  Continue losartan   Denies CP, SOA, dizziness, lightheadedness. Has been checking  usually systolically and bottom number 80-90's. has been Compliant on losartan. Has headache, likely allergy related.             2. Osteoporosis, unspecified osteoporosis type, unspecified pathological fracture presence  Assessment & Plan:  Continue fosamax       3. Vitamin D deficiency  Assessment & Plan:  Continue vitamin D   Check vitamin D level     Orders:  -     Vitamin D 25 hydroxy; Future    4. Allergy, subsequent encounter  Assessment & Plan:  Controlled on claritin, this time of year does flonase as well.       5. Encounter for annual physical exam  Assessment & Plan:  Father- CAD, passed away of heart attack, HTN. Denies smoking. Drinks once weekly. Denies illegal drugs.     PHQ-score: 2  Encourage exercise   Encourage monthly self breast exams  Referral to GYN for papsmear- Dr. Rivero   Check labs  CT lung cancer screening ordered  Mammogram ordered- [place] per patient request      Orders:  -     Ambulatory Referral to " Obstetrics / Gynecology  -     Mammo Screening Digital Tomosynthesis Bilateral With CAD; Future    6. Generalized anxiety disorder  Assessment & Plan:  On effexor and controlled. Denies SI or HI.     Continue effexor     Orders:  -     Lipid panel; Future  -     Comprehensive metabolic panel; Future  -     Hemoglobin A1c; Future    7. Numbness of hand  Assessment & Plan:   bilateral and both her hands. She has had artery scan in within the past 2 years.     Patient educate if have symptoms and not going away then to go to ED  or if have chest pain go to ED       8. Former smoker  Assessment & Plan:  Lung cancer CT screening ordered- requested per patient  Smoked for 15 years, 1/2 PPD     Orders:  -      CT Chest Low Dose Cancer Screening WO; Future    Other orders  -     venlafaxine XR (EFFEXOR-XR) 75 MG 24 hr capsule; Take 1 capsule by mouth Daily.  Dispense: 90 capsule; Refill: 3            Follow Up   Return in about 1 year (around 3/28/2024) for Annual physical.   Patient was given instructions and counseling regarding her condition or for health maintenance advice. Please see specific information pulled into the AVS if appropriate.

## 2023-03-28 ENCOUNTER — OFFICE VISIT (OUTPATIENT)
Dept: FAMILY MEDICINE CLINIC | Facility: CLINIC | Age: 58
End: 2023-03-28
Payer: COMMERCIAL

## 2023-03-28 VITALS
WEIGHT: 184 LBS | OXYGEN SATURATION: 98 % | HEART RATE: 54 BPM | RESPIRATION RATE: 16 BRPM | HEIGHT: 65 IN | DIASTOLIC BLOOD PRESSURE: 83 MMHG | TEMPERATURE: 98.2 F | BODY MASS INDEX: 30.66 KG/M2 | SYSTOLIC BLOOD PRESSURE: 130 MMHG

## 2023-03-28 DIAGNOSIS — E55.9 VITAMIN D DEFICIENCY: ICD-10-CM

## 2023-03-28 DIAGNOSIS — I10 PRIMARY HYPERTENSION: Primary | ICD-10-CM

## 2023-03-28 DIAGNOSIS — R20.0 NUMBNESS OF HAND: ICD-10-CM

## 2023-03-28 DIAGNOSIS — F41.1 GENERALIZED ANXIETY DISORDER: ICD-10-CM

## 2023-03-28 DIAGNOSIS — Z87.891 FORMER SMOKER: ICD-10-CM

## 2023-03-28 DIAGNOSIS — M81.0 OSTEOPOROSIS, UNSPECIFIED OSTEOPOROSIS TYPE, UNSPECIFIED PATHOLOGICAL FRACTURE PRESENCE: ICD-10-CM

## 2023-03-28 DIAGNOSIS — Z00.00 ENCOUNTER FOR ANNUAL PHYSICAL EXAM: ICD-10-CM

## 2023-03-28 DIAGNOSIS — T78.40XD ALLERGY, SUBSEQUENT ENCOUNTER: ICD-10-CM

## 2023-03-28 PROBLEM — T78.40XA ALLERGIES: Status: ACTIVE | Noted: 2023-03-28

## 2023-03-28 RX ORDER — VENLAFAXINE HYDROCHLORIDE 75 MG/1
75 CAPSULE, EXTENDED RELEASE ORAL DAILY
Qty: 90 CAPSULE | Refills: 3 | Status: SHIPPED | OUTPATIENT
Start: 2023-03-28

## 2023-03-28 NOTE — ASSESSMENT & PLAN NOTE
bilateral and both her hands. She has had artery scan in within the past 2 years.     Patient educate if have symptoms and not going away then to go to ED  or if have chest pain go to ED

## 2023-03-28 NOTE — ASSESSMENT & PLAN NOTE
BP: 145/69  Continue losartan   Denies CP, SOA, dizziness, lightheadedness. Has been checking  usually systolically and bottom number 80-90's. has been Compliant on losartan. Has headache, likely allergy related.

## 2023-03-28 NOTE — PATIENT INSTRUCTIONS
Encourage exercise   Encourage monthly self breast exams  Referral to GYN for papsmear- Dr. Rivero   Check labs   Mammogram ordered

## 2023-03-28 NOTE — ASSESSMENT & PLAN NOTE
Father- CAD, passed away of heart attack, HTN. Denies smoking. Drinks once weekly. Denies illegal drugs.     PHQ-score: 2  Encourage exercise   Encourage monthly self breast exams  Referral to GYN for papsmear- Dr. Rivero   Check labs  CT lung cancer screening ordered  Mammogram ordered- [place] per patient request

## 2023-04-03 RX ORDER — ALENDRONATE SODIUM 70 MG/1
TABLET ORAL
Qty: 12 TABLET | Refills: 3 | Status: SHIPPED | OUTPATIENT
Start: 2023-04-03

## 2023-04-03 NOTE — TELEPHONE ENCOUNTER
Rx Refill Note  Requested Prescriptions     Pending Prescriptions Disp Refills   • alendronate (FOSAMAX) 70 MG tablet [Pharmacy Med Name: ALENDRONATE SODIUM TABS 12'S 70MG] 12 tablet 3     Sig: TAKE 1 TABLET EVERY 7 DAYS (NEED APPOINTMENT)      Last office visit with prescribing clinician: Visit date not found   Last telemedicine visit with prescribing clinician: 4/4/2023   Next office visit with prescribing clinician: Visit date not found                       Lipid Panel (11/11/2021 10:06)    Would you like a call back once the refill request has been completed: [] Yes [] No    If the office needs to give you a call back, can they leave a voicemail: [] Yes [] No    Aster Jordan, RT  04/03/23, 09:14 EDT

## 2023-04-04 ENCOUNTER — CLINICAL SUPPORT (OUTPATIENT)
Dept: FAMILY MEDICINE CLINIC | Facility: CLINIC | Age: 58
End: 2023-04-04
Payer: COMMERCIAL

## 2023-04-04 DIAGNOSIS — E55.9 VITAMIN D DEFICIENCY: ICD-10-CM

## 2023-04-04 DIAGNOSIS — F41.1 GENERALIZED ANXIETY DISORDER: ICD-10-CM

## 2023-04-04 PROCEDURE — 36415 COLL VENOUS BLD VENIPUNCTURE: CPT | Performed by: NURSE PRACTITIONER

## 2023-04-04 PROCEDURE — 82306 VITAMIN D 25 HYDROXY: CPT | Performed by: NURSE PRACTITIONER

## 2023-04-04 PROCEDURE — 80053 COMPREHEN METABOLIC PANEL: CPT | Performed by: NURSE PRACTITIONER

## 2023-04-04 PROCEDURE — 83036 HEMOGLOBIN GLYCOSYLATED A1C: CPT | Performed by: NURSE PRACTITIONER

## 2023-04-04 PROCEDURE — 80061 LIPID PANEL: CPT | Performed by: NURSE PRACTITIONER

## 2023-04-04 NOTE — PROGRESS NOTES
Venipuncture performed in left arm by Juany Mercado CMA  with good hemostasis. Patient tolerated well. 04/04/23 Juany Mercado CMA

## 2023-04-05 LAB
25(OH)D3 SERPL-MCNC: 78.4 NG/ML (ref 30–100)
ALBUMIN SERPL-MCNC: 4 G/DL (ref 3.5–5.2)
ALBUMIN/GLOB SERPL: 1.5 G/DL
ALP SERPL-CCNC: 88 U/L (ref 39–117)
ALT SERPL W P-5'-P-CCNC: 28 U/L (ref 1–33)
ANION GAP SERPL CALCULATED.3IONS-SCNC: 6.1 MMOL/L (ref 5–15)
AST SERPL-CCNC: 22 U/L (ref 1–32)
BILIRUB SERPL-MCNC: 0.2 MG/DL (ref 0–1.2)
BUN SERPL-MCNC: 13 MG/DL (ref 6–20)
BUN/CREAT SERPL: 18.8 (ref 7–25)
CALCIUM SPEC-SCNC: 10.1 MG/DL (ref 8.6–10.5)
CHLORIDE SERPL-SCNC: 106 MMOL/L (ref 98–107)
CHOLEST SERPL-MCNC: 211 MG/DL (ref 0–200)
CO2 SERPL-SCNC: 28.9 MMOL/L (ref 22–29)
CREAT SERPL-MCNC: 0.69 MG/DL (ref 0.57–1)
EGFRCR SERPLBLD CKD-EPI 2021: 101.4 ML/MIN/1.73
GLOBULIN UR ELPH-MCNC: 2.7 GM/DL
GLUCOSE SERPL-MCNC: 84 MG/DL (ref 65–99)
HBA1C MFR BLD: 5 % (ref 4.8–5.6)
HDLC SERPL-MCNC: 88 MG/DL (ref 40–60)
LDLC SERPL CALC-MCNC: 115 MG/DL (ref 0–100)
LDLC/HDLC SERPL: 1.29 {RATIO}
POTASSIUM SERPL-SCNC: 4.6 MMOL/L (ref 3.5–5.2)
PROT SERPL-MCNC: 6.7 G/DL (ref 6–8.5)
SODIUM SERPL-SCNC: 141 MMOL/L (ref 136–145)
TRIGL SERPL-MCNC: 46 MG/DL (ref 0–150)
VLDLC SERPL-MCNC: 8 MG/DL (ref 5–40)

## 2023-04-28 ENCOUNTER — HOSPITAL ENCOUNTER (OUTPATIENT)
Dept: MAMMOGRAPHY | Facility: HOSPITAL | Age: 58
Discharge: HOME | End: 2023-04-28
Payer: COMMERCIAL

## 2023-04-28 ENCOUNTER — HOSPITAL ENCOUNTER (OUTPATIENT)
Dept: CT IMAGING | Facility: HOSPITAL | Age: 58
Discharge: HOME | End: 2023-04-28
Payer: COMMERCIAL

## 2023-04-28 DIAGNOSIS — Z87.891 FORMER SMOKER: ICD-10-CM

## 2023-04-28 DIAGNOSIS — Z00.00 ENCOUNTER FOR ANNUAL PHYSICAL EXAM: ICD-10-CM

## 2023-04-28 PROCEDURE — 77067 SCR MAMMO BI INCL CAD: CPT

## 2023-04-28 PROCEDURE — 71271 CT THORAX LUNG CANCER SCR C-: CPT

## 2023-04-28 PROCEDURE — 77063 BREAST TOMOSYNTHESIS BI: CPT

## 2023-05-10 ENCOUNTER — TELEPHONE (OUTPATIENT)
Dept: FAMILY MEDICINE CLINIC | Facility: CLINIC | Age: 58
End: 2023-05-10
Payer: COMMERCIAL

## 2023-05-10 NOTE — TELEPHONE ENCOUNTER
LVM regarding referral to OBGYN of [facility]. Needing to know if patient has been scheduled or seen or is still wanting to get scheduled    Okay for Hub to Read/Reply

## 2023-05-15 RX ORDER — LOSARTAN POTASSIUM 25 MG/1
25 TABLET ORAL DAILY
Qty: 90 TABLET | Refills: 3 | Status: SHIPPED | OUTPATIENT
Start: 2023-05-15 | End: 2023-05-15 | Stop reason: SDUPTHER

## 2023-05-15 RX ORDER — LOSARTAN POTASSIUM 25 MG/1
25 TABLET ORAL DAILY
Qty: 90 TABLET | Refills: 3 | Status: SHIPPED | OUTPATIENT
Start: 2023-05-15

## 2023-05-15 NOTE — TELEPHONE ENCOUNTER
Incoming Refill Request      Medication requested (name and dose): losartan 25mg    Pharmacy where request should be sent: [facility]    Additional details provided by patient: n/a    Best call back number:     Does the patient have less than a 3 day supply:  [] Yes  [x] No    Eleanor Boswell Rep  05/15/23, 08:17 EDT

## 2024-03-22 RX ORDER — VENLAFAXINE HYDROCHLORIDE 75 MG/1
75 CAPSULE, EXTENDED RELEASE ORAL DAILY
Qty: 90 CAPSULE | Refills: 3 | Status: SHIPPED | OUTPATIENT
Start: 2024-03-22

## 2024-03-22 NOTE — TELEPHONE ENCOUNTER
Rx Refill Note  Requested Prescriptions     Pending Prescriptions Disp Refills    venlafaxine XR (EFFEXOR-XR) 75 MG 24 hr capsule [Pharmacy Med Name: VENLAFAXINE HCL ER CAPS 75MG] 90 capsule 3     Sig: TAKE 1 CAPSULE DAILY      Last office visit with prescribing clinician: 3/28/2023   Last telemedicine visit with prescribing clinician: Visit date not found   Next office visit with prescribing clinician: 5/15/2024                         Would you like a call back once the refill request has been completed: [] Yes [] No    If the office needs to give you a call back, can they leave a voicemail: [] Yes [] No    Juany Mercado, ALFIE  03/22/24, 08:11 EDT

## 2024-03-25 RX ORDER — VENLAFAXINE HYDROCHLORIDE 75 MG/1
75 CAPSULE, EXTENDED RELEASE ORAL DAILY
Qty: 90 CAPSULE | Refills: 3 | OUTPATIENT
Start: 2024-03-25

## 2024-04-26 RX ORDER — LOSARTAN POTASSIUM 25 MG/1
25 TABLET ORAL DAILY
Qty: 90 TABLET | Refills: 3 | Status: SHIPPED | OUTPATIENT
Start: 2024-04-26

## 2024-04-26 NOTE — TELEPHONE ENCOUNTER
Rx Refill Note  Requested Prescriptions     Pending Prescriptions Disp Refills    losartan (COZAAR) 25 MG tablet [Pharmacy Med Name: LOSARTAN TABS 25MG] 90 tablet 3     Sig: TAKE 1 TABLET DAILY      Last office visit with prescribing clinician: 3/28/2023   Last telemedicine visit with prescribing clinician: Visit date not found   Next office visit with prescribing clinician: 5/15/2024        Lipid panel (04/04/2023 08:43)                  Would you like a call back once the refill request has been completed: [] Yes [] No    If the office needs to give you a call back, can they leave a voicemail: [] Yes [] No    Aster Jordan, RT  04/26/24, 08:33 EDT

## 2024-05-13 PROBLEM — J32.9 SINUSITIS: Status: RESOLVED | Noted: 2017-01-18 | Resolved: 2024-05-13

## 2024-05-13 PROBLEM — J02.9 SORE THROAT: Status: RESOLVED | Noted: 2017-01-18 | Resolved: 2024-05-13

## 2024-05-13 NOTE — PROGRESS NOTES
"Chief Complaint  Chief Complaint   Patient presents with    Annual Exam     Physical        Subjective          Anali Julio is a 59-year-old female who reports to the office today for annual physical.    Annual physical-Denies any new family history. Denies smoking, drinks once a week, denies illegal drug use.     Anxiety- She reports this has been. Resolved.     Hypertension-Denies CP, SOA, dizziness, lightheadedness, or HA. She has been checking her BP at home. Compliant on medication.     Allergies-She reports she takes zyrtec and flonase. She reports her allergies have been crazy.     Osteoporosis- On vitamin D       Labs-Due   Colonoscopy-UTD   DEXA-Due   Mammogram-Due  Papsmear-Due       Vaccines:  Covid-19- Due     Dental exam- UTD   Eye exam-UTD          Review of Systems   Constitutional:  Negative for chills and fever.   HENT:  Negative for congestion.    Eyes: Negative.    Respiratory:  Negative for shortness of breath.    Cardiovascular:  Negative for chest pain.   Gastrointestinal:  Negative for abdominal pain, nausea and vomiting.   Genitourinary:  Negative for difficulty urinating.   Musculoskeletal:  Negative for myalgias.   Skin: Negative.    Neurological:  Negative for dizziness, light-headedness and headache.   Psychiatric/Behavioral:  Negative for self-injury, suicidal ideas and depressed mood. The patient is not nervous/anxious.         Objective   Vital Signs:   Vitals:    05/15/24 1528   BP: 116/70   Pulse: 59   Resp: 12   Temp: 98.2 °F (36.8 °C)   SpO2: 99%      Estimated body mass index is 28.04 kg/m² as calculated from the following:    Height as of this encounter: 170.2 cm (67\").    Weight as of this encounter: 81.2 kg (179 lb).    BMI is within normal parameters. No other follow-up for BMI required.            Patient screened negative for depression based on a PHQ-9 score of 0 on 5/15/2024. Follow-up recommendations include: PCP managing depression.        Physical Exam  Vitals reviewed. "   Constitutional:       Appearance: Normal appearance.   HENT:      Head: Normocephalic and atraumatic.      Nose: Nose normal.      Mouth/Throat:      Mouth: Mucous membranes are moist.      Pharynx: Oropharynx is clear.   Eyes:      Extraocular Movements: Extraocular movements intact.      Conjunctiva/sclera: Conjunctivae normal.      Pupils: Pupils are equal, round, and reactive to light.   Cardiovascular:      Rate and Rhythm: Normal rate and regular rhythm.      Pulses: Normal pulses.      Heart sounds: Normal heart sounds.      Comments: S1, S2 audible  Pulmonary:      Effort: Pulmonary effort is normal.      Breath sounds: Normal breath sounds.      Comments: On room air   Abdominal:      General: Abdomen is flat. Bowel sounds are normal.      Palpations: Abdomen is soft.   Musculoskeletal:         General: Normal range of motion.      Cervical back: Normal range of motion.   Skin:     General: Skin is warm and dry.   Neurological:      General: No focal deficit present.      Mental Status: She is alert and oriented to person, place, and time. Mental status is at baseline.   Psychiatric:         Mood and Affect: Mood normal.         Behavior: Behavior normal.         Thought Content: Thought content normal.         Judgment: Judgment normal.                Physical Exam   Result Review :             Procedures       Assessment and Plan     Diagnoses and all orders for this visit:    1. Generalized anxiety disorder (Primary)  Assessment & Plan:  Anxiety- She reports this has been. Resolved.     On effexor       2. Primary hypertension  Assessment & Plan:  BP: 116/70    Hypertension-Denies CP, SOA, dizziness, lightheadedness, or HA. She has been checking her BP at home. Compliant on medication.     On losartan       3. Allergy, subsequent encounter  Assessment & Plan:  Allergies-She reports she takes zyrtec and flonase. She reports her allergies have been crazy.       4. Encounter for annual physical  exam  Assessment & Plan:  Annual physical-Denies any new family history. Denies smoking, drinks once a week, denies illegal drug use.     Labs-Due   Colonoscopy-UTD   DEXA-Due   Mammogram-Due  Papsmear-Due       Vaccines:  Covid-19- Due     Dental exam- UTD   Eye exam-UTD     Encourage healthy diet and exercise  Encourage monthly self breast exams  Check labs - recent labs reviewed from weight loss clinic on 4/17/2024   Mammogram ordered  DEXA scan ordered     Orders:  -     POC Glycosylated Hemoglobin (Hb A1C)    5. Osteoporosis, unspecified osteoporosis type, unspecified pathological fracture presence  Assessment & Plan:  Osteoporosis- On vitamin D     Orders:  -     DEXA Bone Density Axial    6. Breast cancer screening by mammogram  -     Mammo Screening Digital Tomosynthesis Bilateral With CAD; Future    7. Overweight (BMI 25.0-29.9)  Assessment & Plan:  Patient's (Body mass index is 28.04 kg/m².)     Encourage healthy diet and exercise  She reports she is on semaglutide through weight loss clinic      8. Bradycardia  Assessment & Plan:  She has low heart rate with echo at Neurological visit with [facility]  She got into alzheimer's study at [facility] due to family history                 Follow Up   Return in about 1 year (around 5/15/2025) for Annual physical.   Patient was given instructions and counseling regarding her condition or for health maintenance advice. Please see specific information pulled into the AVS if appropriate.

## 2024-05-15 ENCOUNTER — OFFICE VISIT (OUTPATIENT)
Dept: FAMILY MEDICINE CLINIC | Facility: CLINIC | Age: 59
End: 2024-05-15
Payer: COMMERCIAL

## 2024-05-15 VITALS
HEIGHT: 67 IN | DIASTOLIC BLOOD PRESSURE: 70 MMHG | OXYGEN SATURATION: 99 % | TEMPERATURE: 98.2 F | SYSTOLIC BLOOD PRESSURE: 116 MMHG | HEART RATE: 59 BPM | BODY MASS INDEX: 28.09 KG/M2 | WEIGHT: 179 LBS | RESPIRATION RATE: 12 BRPM

## 2024-05-15 DIAGNOSIS — Z00.00 ENCOUNTER FOR ANNUAL PHYSICAL EXAM: ICD-10-CM

## 2024-05-15 DIAGNOSIS — F41.1 GENERALIZED ANXIETY DISORDER: Primary | ICD-10-CM

## 2024-05-15 DIAGNOSIS — R00.1 BRADYCARDIA: ICD-10-CM

## 2024-05-15 DIAGNOSIS — Z12.31 BREAST CANCER SCREENING BY MAMMOGRAM: ICD-10-CM

## 2024-05-15 DIAGNOSIS — E66.3 OVERWEIGHT (BMI 25.0-29.9): ICD-10-CM

## 2024-05-15 DIAGNOSIS — M81.0 OSTEOPOROSIS, UNSPECIFIED OSTEOPOROSIS TYPE, UNSPECIFIED PATHOLOGICAL FRACTURE PRESENCE: ICD-10-CM

## 2024-05-15 DIAGNOSIS — I10 PRIMARY HYPERTENSION: ICD-10-CM

## 2024-05-15 DIAGNOSIS — T78.40XD ALLERGY, SUBSEQUENT ENCOUNTER: ICD-10-CM

## 2024-05-15 PROBLEM — R53.83 FATIGUE: Status: RESOLVED | Noted: 2018-08-02 | Resolved: 2024-05-15

## 2024-05-15 LAB
EXPIRATION DATE: NORMAL
HBA1C MFR BLD: 4.9 % (ref 4.5–5.7)
Lab: NORMAL

## 2024-05-15 PROCEDURE — 83036 HEMOGLOBIN GLYCOSYLATED A1C: CPT | Performed by: NURSE PRACTITIONER

## 2024-05-15 PROCEDURE — 99396 PREV VISIT EST AGE 40-64: CPT | Performed by: NURSE PRACTITIONER

## 2024-05-15 NOTE — Clinical Note
Patients HgbA1c today is 4.9%. Patient states that we can sent her a my chart message with the results.

## 2024-05-15 NOTE — ASSESSMENT & PLAN NOTE
She has low heart rate with echo at Neurological visit with [facility]  She got into alzheimer's study at [facility] due to family history

## 2024-05-15 NOTE — ASSESSMENT & PLAN NOTE
BP: 116/70    Hypertension-Denies CP, SOA, dizziness, lightheadedness, or HA. She has been checking her BP at home. Compliant on medication.     On losartan

## 2024-05-15 NOTE — ASSESSMENT & PLAN NOTE
Annual physical-Denies any new family history. Denies smoking, drinks once a week, denies illegal drug use.     Labs-Due   Colonoscopy-UTD   DEXA-Due   Mammogram-Due  Papsmear-Due       Vaccines:  Covid-19- Due     Dental exam- UTD   Eye exam-UTD     Encourage healthy diet and exercise  Encourage monthly self breast exams  Check labs - recent labs reviewed from weight loss clinic on 4/17/2024   Mammogram ordered  DEXA scan ordered

## 2024-05-15 NOTE — ASSESSMENT & PLAN NOTE
Patient's (Body mass index is 28.04 kg/m².)     Encourage healthy diet and exercise  She reports she is on semaglutide through weight loss clinic

## 2024-05-30 ENCOUNTER — HOSPITAL ENCOUNTER (OUTPATIENT)
Dept: BONE DENSITY | Facility: HOSPITAL | Age: 59
Discharge: HOME | End: 2024-05-30
Admitting: NURSE PRACTITIONER
Payer: COMMERCIAL

## 2024-05-30 ENCOUNTER — HOSPITAL ENCOUNTER (OUTPATIENT)
Dept: MAMMOGRAPHY | Facility: HOSPITAL | Age: 59
Discharge: HOME | End: 2024-05-30
Payer: COMMERCIAL

## 2024-05-30 DIAGNOSIS — Z12.31 BREAST CANCER SCREENING BY MAMMOGRAM: ICD-10-CM

## 2024-05-30 PROCEDURE — 77067 SCR MAMMO BI INCL CAD: CPT

## 2024-05-30 PROCEDURE — 77080 DXA BONE DENSITY AXIAL: CPT

## 2024-05-30 PROCEDURE — 77063 BREAST TOMOSYNTHESIS BI: CPT

## 2024-08-08 ENCOUNTER — TELEPHONE (OUTPATIENT)
Dept: FAMILY MEDICINE CLINIC | Facility: CLINIC | Age: 59
End: 2024-08-08
Payer: COMMERCIAL

## 2024-08-08 NOTE — TELEPHONE ENCOUNTER
HUB TO RELAY    Called patient to let them know per Afsaneh that we received documentation from insurance she is due for a Lung Cancer Screening Exam.    Afsaneh wanted to know if she would like to have this testing done and if Anali could let her know so she could order it for her. Patient just needs to let Afsaneh know if she wants the testing or not.     LEFT VOICEMAIL INFORMING PT    Noted at OSH. TVUS with thickened endometrial stripe  - needs outpatient GYN follow up

## 2025-03-17 RX ORDER — VENLAFAXINE HYDROCHLORIDE 75 MG/1
75 CAPSULE, EXTENDED RELEASE ORAL DAILY
Qty: 90 CAPSULE | Refills: 3 | Status: SHIPPED | OUTPATIENT
Start: 2025-03-17

## 2025-04-21 RX ORDER — LOSARTAN POTASSIUM 25 MG/1
25 TABLET ORAL DAILY
Qty: 90 TABLET | Refills: 1 | Status: SHIPPED | OUTPATIENT
Start: 2025-04-21

## 2025-04-21 NOTE — TELEPHONE ENCOUNTER
Rx Refill Note  Requested Prescriptions     Pending Prescriptions Disp Refills    losartan (COZAAR) 25 MG tablet [Pharmacy Med Name: LOSARTAN TABS 25MG] 90 tablet 3     Sig: TAKE 1 TABLET DAILY      Last office visit with prescribing clinician: 5/15/2024   Last telemedicine visit with prescribing clinician: Visit date not found   Next office visit with prescribing clinician: 5/19/2025        Lipid panel (04/04/2023 08:43)                  Would you like a call back once the refill request has been completed: [] Yes [] No    If the office needs to give you a call back, can they leave a voicemail: [] Yes [] No    Aster Jordan, RT  04/21/25, 10:33 EDT

## 2025-05-14 NOTE — PROGRESS NOTES
Chief Complaint  Chief Complaint   Patient presents with    Annual Exam     Physical        Subjective          Anali Julio is a 60-year-old female who reports to the office today for annual physical.     Annual physical-Denies any new family history. Denies smoking, drinks once a week, denies illegal drug use.      Hypertension-Denies CP, SOA, dizziness, lightheadedness, or HA. She has noticed at times her heart feels like it is racing, but her watch shows her heart rate is not high. She reports this happens a lot. She is doing GLP-1 injections. She has been checking her BP at home. Compliant on medication.      Allergies-She reports she takes zyrtec and flonase. She reports her allergies have been crazy. She has tried switching her allergy medications.      Osteoporosis- On vitamin D and fosamax. She reports her last bone scan was last year.     Basal cell carcinoma- Goes to [facility]. Recently went in March for appointment.     Goiter- She reports her labs have always been good, but would like to have rechecked.     Bradycardia- She reports she has had heart palpitations. Her applewatch has been 51-95 and average 67, with max 134.         Labs-Due  Colonoscopy-UTD   Mammogram-Due  Papsmear-UTD       Vaccines:  PNA-UTD    Dental exam- UTD  Eye exam-UTD          Review of Systems   Constitutional:  Negative for chills and fever.   HENT:  Negative for congestion.    Eyes: Negative.    Respiratory:  Negative for shortness of breath.    Cardiovascular:  Negative for chest pain.   Gastrointestinal:  Negative for abdominal pain and nausea.   Genitourinary:  Negative for difficulty urinating.   Musculoskeletal:  Negative for myalgias.   Skin: Negative.    Neurological:  Negative for dizziness, light-headedness and headache.   Psychiatric/Behavioral:  Negative for self-injury, suicidal ideas and depressed mood. The patient is not nervous/anxious.         Objective   Vital Signs:   Vitals:    05/19/25 0917  "  BP: 116/71   Pulse: 55   Resp: 14   Temp: 98.2 °F (36.8 °C)   SpO2: 97%      Estimated body mass index is 24.75 kg/m² as calculated from the following:    Height as of this encounter: 170.2 cm (67\").    Weight as of this encounter: 71.7 kg (158 lb).                  Patient screened negative for depression based on a PHQ-9 score of 0 on 5/19/2025 . Follow-up recommendations include: PCP managing depression.        Physical Exam  Vitals reviewed.   Constitutional:       Appearance: Normal appearance.   HENT:      Head: Normocephalic and atraumatic.      Right Ear: Tympanic membrane, ear canal and external ear normal.      Left Ear: Tympanic membrane, ear canal and external ear normal.      Nose: Nose normal.      Mouth/Throat:      Mouth: Mucous membranes are moist.      Pharynx: Oropharynx is clear.   Eyes:      Extraocular Movements: Extraocular movements intact.      Conjunctiva/sclera: Conjunctivae normal.      Pupils: Pupils are equal, round, and reactive to light.   Cardiovascular:      Rate and Rhythm: Normal rate and regular rhythm.      Pulses: Normal pulses.      Heart sounds: Normal heart sounds.      Comments: S1, S2 audible  Pulmonary:      Effort: Pulmonary effort is normal.      Breath sounds: Normal breath sounds.      Comments: On room air   Abdominal:      General: Abdomen is flat. Bowel sounds are normal.      Palpations: Abdomen is soft.   Musculoskeletal:         General: Normal range of motion.      Cervical back: Normal range of motion and neck supple.   Skin:     General: Skin is warm and dry.   Neurological:      General: No focal deficit present.      Mental Status: She is alert and oriented to person, place, and time. Mental status is at baseline.   Psychiatric:         Mood and Affect: Mood normal.         Behavior: Behavior normal.         Thought Content: Thought content normal.         Judgment: Judgment normal.                Physical Exam   Result Review :             Procedures     "   Assessment and Plan     Diagnoses and all orders for this visit:    1. Breast cancer screening by mammogram (Primary)  -     Mammo Screening Digital Tomosynthesis Bilateral With CAD; Future    2. Encounter for annual physical exam  Assessment & Plan:  Labs-Due  Colonoscopy-UTD   Mammogram-Due  Papsmear-UTD       Vaccines:  PNA-UTD    Dental exam- UTD  Eye exam-UTD     Annual physical-Denies any new family history. Denies smoking, drinks once a week, denies illegal drug use.     Encourage the diet and exercise   Encouraged monthly self breast exams   Check labs  Mammogram ordered   DEXA scan due 2026        Orders:  -     Comprehensive Metabolic Panel  -     Hemoglobin A1c  -     Lipid Panel    3. Primary hypertension  Assessment & Plan:  BP: 116/71    On losartan-monitor kidney function     Hypertension-Denies CP, SOA, dizziness, lightheadedness, or HA. She has noticed at times her heart feels like it is racing, but her watch shows her heart rate is not high. She reports this happens a lot. She is doing GLP-1 injections. She has been checking her BP at home. Compliant on medication.       4. Allergy, subsequent encounter  Assessment & Plan:    Allergies-She reports she takes zyrtec and flonase. She reports her allergies have been crazy. She has tried switching her allergy medications.       5. Basal cell carcinoma (BCC), unspecified site  Assessment & Plan:  Basal cell carcinoma- Goes to [facility]. Recently went in March for appointment.       6. Goiter  Assessment & Plan:  Check TSH     Goiter- She reports her labs have always been good, but would like to have rechecked.     Orders:  -     TSH  -     T3, free  -     T4    7. Bradycardia  Assessment & Plan:  Bradycardia- She reports she has had heart palpitations. Her applewatch has been 51-95 and average 67, with max 134.     Check TSH    Consider cardiology referral for holter monitor if needed                 Follow Up   Return in about 1 year  (around 5/19/2026) for Annual physical.   Patient was given instructions and counseling regarding her condition or for health maintenance advice. Please see specific information pulled into the AVS if appropriate.

## 2025-05-16 NOTE — ASSESSMENT & PLAN NOTE
Labs-Due  Colonoscopy-UTD   Mammogram-Due  Papsmear-UTD       Vaccines:  PNA-UTD    Dental exam- UTD  Eye exam-UTD     Annual physical-Denies any new family history. Denies smoking, drinks once a week, denies illegal drug use.     Encourage the diet and exercise   Encouraged monthly self breast exams   Check labs  Mammogram ordered   DEXA scan due 2026

## 2025-05-16 NOTE — ASSESSMENT & PLAN NOTE
Allergies-She reports she takes zyrtec and flonase. She reports her allergies have been crazy. She has tried switching her allergy medications.

## 2025-05-16 NOTE — ASSESSMENT & PLAN NOTE
BP: 116/71    On losartan-monitor kidney function     Hypertension-Denies CP, SOA, dizziness, lightheadedness, or HA. She has noticed at times her heart feels like it is racing, but her watch shows her heart rate is not high. She reports this happens a lot. She is doing GLP-1 injections. She has been checking her BP at home. Compliant on medication.

## 2025-05-19 ENCOUNTER — OFFICE VISIT (OUTPATIENT)
Dept: FAMILY MEDICINE CLINIC | Facility: CLINIC | Age: 60
End: 2025-05-19
Payer: COMMERCIAL

## 2025-05-19 VITALS
RESPIRATION RATE: 14 BRPM | HEIGHT: 67 IN | BODY MASS INDEX: 24.8 KG/M2 | TEMPERATURE: 98.2 F | OXYGEN SATURATION: 97 % | HEART RATE: 55 BPM | WEIGHT: 158 LBS | SYSTOLIC BLOOD PRESSURE: 116 MMHG | DIASTOLIC BLOOD PRESSURE: 71 MMHG

## 2025-05-19 DIAGNOSIS — Z12.31 BREAST CANCER SCREENING BY MAMMOGRAM: Primary | ICD-10-CM

## 2025-05-19 DIAGNOSIS — E04.9 GOITER: ICD-10-CM

## 2025-05-19 DIAGNOSIS — T78.40XD ALLERGY, SUBSEQUENT ENCOUNTER: ICD-10-CM

## 2025-05-19 DIAGNOSIS — I10 PRIMARY HYPERTENSION: ICD-10-CM

## 2025-05-19 DIAGNOSIS — C44.91 BASAL CELL CARCINOMA (BCC), UNSPECIFIED SITE: ICD-10-CM

## 2025-05-19 DIAGNOSIS — R00.1 BRADYCARDIA: ICD-10-CM

## 2025-05-19 DIAGNOSIS — Z00.00 ENCOUNTER FOR ANNUAL PHYSICAL EXAM: ICD-10-CM

## 2025-05-19 PROBLEM — E66.3 OVERWEIGHT (BMI 25.0-29.9): Status: RESOLVED | Noted: 2024-05-15 | Resolved: 2025-05-19

## 2025-05-19 PROBLEM — L82.1 SEBORRHEIC KERATOSIS: Status: ACTIVE | Noted: 2024-04-15

## 2025-05-19 PROBLEM — L57.0 ACTINIC KERATOSIS: Status: ACTIVE | Noted: 2025-04-15

## 2025-05-19 PROBLEM — D22.5 MELANOCYTIC NEVUS OF TRUNK: Status: ACTIVE | Noted: 2024-04-15

## 2025-05-19 PROCEDURE — 84436 ASSAY OF TOTAL THYROXINE: CPT | Performed by: NURSE PRACTITIONER

## 2025-05-19 PROCEDURE — 80053 COMPREHEN METABOLIC PANEL: CPT | Performed by: NURSE PRACTITIONER

## 2025-05-19 PROCEDURE — 80061 LIPID PANEL: CPT | Performed by: NURSE PRACTITIONER

## 2025-05-19 PROCEDURE — 36415 COLL VENOUS BLD VENIPUNCTURE: CPT | Performed by: NURSE PRACTITIONER

## 2025-05-19 PROCEDURE — 84443 ASSAY THYROID STIM HORMONE: CPT | Performed by: NURSE PRACTITIONER

## 2025-05-19 PROCEDURE — 84481 FREE ASSAY (FT-3): CPT | Performed by: NURSE PRACTITIONER

## 2025-05-19 PROCEDURE — 83036 HEMOGLOBIN GLYCOSYLATED A1C: CPT | Performed by: NURSE PRACTITIONER

## 2025-05-19 PROCEDURE — 99396 PREV VISIT EST AGE 40-64: CPT | Performed by: NURSE PRACTITIONER

## 2025-05-19 NOTE — PATIENT INSTRUCTIONS
Encourage the diet and exercise   Encouraged monthly self breast exams   Check labs  Mammogram ordered   DEXA scan due 2026

## 2025-05-19 NOTE — PROGRESS NOTES
Venipuncture performed in [facility] by RT Kristie  with good hemostasis. Patient tolerated well. 05/19/25 Aster Jordan, RT

## 2025-05-19 NOTE — ASSESSMENT & PLAN NOTE
Check TSH     Goiter- She reports her labs have always been good, but would like to have rechecked.

## 2025-05-19 NOTE — ASSESSMENT & PLAN NOTE
Bradycardia- She reports she has had heart palpitations. Her applewatch has been 51-95 and average 67, with max 134.     Check TSH    Consider cardiology referral for holter monitor if needed

## 2025-05-20 LAB
ALBUMIN SERPL-MCNC: 4.3 G/DL (ref 3.5–5.2)
ALBUMIN/GLOB SERPL: 1.5 G/DL
ALP SERPL-CCNC: 87 U/L (ref 39–117)
ALT SERPL W P-5'-P-CCNC: 24 U/L (ref 1–33)
ANION GAP SERPL CALCULATED.3IONS-SCNC: 10 MMOL/L (ref 5–15)
AST SERPL-CCNC: 32 U/L (ref 1–32)
BILIRUB SERPL-MCNC: 0.4 MG/DL (ref 0–1.2)
BUN SERPL-MCNC: 14 MG/DL (ref 6–20)
BUN/CREAT SERPL: 15.1 (ref 7–25)
CALCIUM SPEC-SCNC: 9.9 MG/DL (ref 8.6–10.5)
CHLORIDE SERPL-SCNC: 108 MMOL/L (ref 98–107)
CHOLEST SERPL-MCNC: 235 MG/DL (ref 0–200)
CO2 SERPL-SCNC: 24 MMOL/L (ref 22–29)
CREAT SERPL-MCNC: 0.93 MG/DL (ref 0.57–1)
EGFRCR SERPLBLD CKD-EPI 2021: 70.9 ML/MIN/1.73
GLOBULIN UR ELPH-MCNC: 2.9 GM/DL
GLUCOSE SERPL-MCNC: 83 MG/DL (ref 65–99)
HBA1C MFR BLD: 5.1 % (ref 4.8–5.6)
HDLC SERPL-MCNC: 101 MG/DL (ref 40–60)
LDLC SERPL CALC-MCNC: 127 MG/DL (ref 0–100)
LDLC/HDLC SERPL: 1.25 {RATIO}
POTASSIUM SERPL-SCNC: 5.2 MMOL/L (ref 3.5–5.2)
PROT SERPL-MCNC: 7.2 G/DL (ref 6–8.5)
SODIUM SERPL-SCNC: 142 MMOL/L (ref 136–145)
T3FREE SERPL-MCNC: 3.42 PG/ML (ref 2–4.4)
T4 SERPL-MCNC: 7.08 MCG/DL (ref 4.5–11.7)
TRIGL SERPL-MCNC: 41 MG/DL (ref 0–150)
TSH SERPL DL<=0.05 MIU/L-ACNC: 0.76 UIU/ML (ref 0.27–4.2)
VLDLC SERPL-MCNC: 7 MG/DL (ref 5–40)

## 2025-06-03 ENCOUNTER — HOSPITAL ENCOUNTER (OUTPATIENT)
Dept: MAMMOGRAPHY | Facility: HOSPITAL | Age: 60
Discharge: HOME | End: 2025-06-03
Admitting: NURSE PRACTITIONER
Payer: COMMERCIAL

## 2025-06-03 DIAGNOSIS — Z12.31 BREAST CANCER SCREENING BY MAMMOGRAM: ICD-10-CM

## 2025-06-03 LAB
NCCN CRITERIA FLAG: NORMAL
TYRER CUZICK SCORE: 6.8

## 2025-06-03 PROCEDURE — 77063 BREAST TOMOSYNTHESIS BI: CPT

## 2025-06-03 PROCEDURE — 77067 SCR MAMMO BI INCL CAD: CPT
